# Patient Record
Sex: FEMALE | Race: OTHER | Employment: UNEMPLOYED | URBAN - METROPOLITAN AREA
[De-identification: names, ages, dates, MRNs, and addresses within clinical notes are randomized per-mention and may not be internally consistent; named-entity substitution may affect disease eponyms.]

---

## 2023-01-26 ENCOUNTER — HOSPITAL ENCOUNTER (EMERGENCY)
Age: 28
Discharge: HOME | End: 2023-01-26
Attending: EMERGENCY MEDICINE | Admitting: OBSTETRICS & GYNECOLOGY

## 2023-01-26 ENCOUNTER — APPOINTMENT (OUTPATIENT)
Dept: ULTRASOUND IMAGING | Age: 28
End: 2023-01-26
Attending: PHYSICIAN ASSISTANT

## 2023-01-26 VITALS
OXYGEN SATURATION: 99 % | DIASTOLIC BLOOD PRESSURE: 76 MMHG | HEIGHT: 60 IN | BODY MASS INDEX: 24.24 KG/M2 | RESPIRATION RATE: 16 BRPM | WEIGHT: 123.46 LBS | SYSTOLIC BLOOD PRESSURE: 121 MMHG | HEART RATE: 82 BPM | TEMPERATURE: 98.7 F

## 2023-01-26 DIAGNOSIS — O26.893 ABDOMINAL PAIN DURING PREGNANCY IN THIRD TRIMESTER: Primary | ICD-10-CM

## 2023-01-26 DIAGNOSIS — R10.9 ABDOMINAL PAIN DURING PREGNANCY IN THIRD TRIMESTER: Primary | ICD-10-CM

## 2023-01-26 LAB
ABO + RH BLD: NORMAL
ALBUMIN SERPL-MCNC: 2.9 G/DL (ref 3.5–5)
ALBUMIN/GLOB SERPL: 0.7 (ref 1.1–2.2)
ALP SERPL-CCNC: 158 U/L (ref 45–117)
ALT SERPL-CCNC: 26 U/L (ref 12–78)
ANION GAP SERPL CALC-SCNC: 10 MMOL/L (ref 5–15)
APPEARANCE UR: CLEAR
AST SERPL-CCNC: 19 U/L (ref 15–37)
BACTERIA URNS QL MICRO: NEGATIVE /HPF
BASOPHILS # BLD: 0 K/UL (ref 0–0.1)
BASOPHILS # BLD: 0 K/UL (ref 0–0.1)
BASOPHILS NFR BLD: 0 % (ref 0–1)
BASOPHILS NFR BLD: 0 % (ref 0–1)
BILIRUB SERPL-MCNC: 0.2 MG/DL (ref 0.2–1)
BILIRUB UR QL: NEGATIVE
BLOOD GROUP ANTIBODIES SERPL: NORMAL
BUN SERPL-MCNC: 6 MG/DL (ref 6–20)
BUN/CREAT SERPL: 10 (ref 12–20)
CALCIUM SERPL-MCNC: 8.6 MG/DL (ref 8.5–10.1)
CHLORIDE SERPL-SCNC: 106 MMOL/L (ref 97–108)
CO2 SERPL-SCNC: 22 MMOL/L (ref 21–32)
COLOR UR: ABNORMAL
CREAT SERPL-MCNC: 0.6 MG/DL (ref 0.55–1.02)
DIFFERENTIAL METHOD BLD: ABNORMAL
DIFFERENTIAL METHOD BLD: ABNORMAL
EOSINOPHIL # BLD: 0 K/UL (ref 0–0.4)
EOSINOPHIL # BLD: 0 K/UL (ref 0–0.4)
EOSINOPHIL NFR BLD: 0 % (ref 0–7)
EOSINOPHIL NFR BLD: 0 % (ref 0–7)
EPITH CASTS URNS QL MICRO: ABNORMAL /LPF
ERYTHROCYTE [DISTWIDTH] IN BLOOD BY AUTOMATED COUNT: 11.6 % (ref 11.5–14.5)
ERYTHROCYTE [DISTWIDTH] IN BLOOD BY AUTOMATED COUNT: 11.8 % (ref 11.5–14.5)
GLOBULIN SER CALC-MCNC: 4.1 G/DL (ref 2–4)
GLUCOSE SERPL-MCNC: 84 MG/DL (ref 65–100)
GLUCOSE UR STRIP.AUTO-MCNC: NEGATIVE MG/DL
HCG SERPL-ACNC: ABNORMAL MIU/ML (ref 0–6)
HCG UR QL: POSITIVE
HCT VFR BLD AUTO: 31.7 % (ref 35–47)
HCT VFR BLD AUTO: 32.8 % (ref 35–47)
HGB BLD-MCNC: 10.9 G/DL (ref 11.5–16)
HGB BLD-MCNC: 11 G/DL (ref 11.5–16)
HGB UR QL STRIP: NEGATIVE
HYALINE CASTS URNS QL MICRO: ABNORMAL /LPF (ref 0–2)
IMM GRANULOCYTES # BLD AUTO: 0.1 K/UL (ref 0–0.04)
IMM GRANULOCYTES # BLD AUTO: 0.1 K/UL (ref 0–0.04)
IMM GRANULOCYTES NFR BLD AUTO: 1 % (ref 0–0.5)
IMM GRANULOCYTES NFR BLD AUTO: 1 % (ref 0–0.5)
KETONES UR QL STRIP.AUTO: 15 MG/DL
LEUKOCYTE ESTERASE UR QL STRIP.AUTO: ABNORMAL
LYMPHOCYTES # BLD: 1.6 K/UL (ref 0.8–3.5)
LYMPHOCYTES # BLD: 1.9 K/UL (ref 0.8–3.5)
LYMPHOCYTES NFR BLD: 17 % (ref 12–49)
LYMPHOCYTES NFR BLD: 20 % (ref 12–49)
MCH RBC QN AUTO: 30.3 PG (ref 26–34)
MCH RBC QN AUTO: 30.9 PG (ref 26–34)
MCHC RBC AUTO-ENTMCNC: 33.5 G/DL (ref 30–36.5)
MCHC RBC AUTO-ENTMCNC: 34.4 G/DL (ref 30–36.5)
MCV RBC AUTO: 89.8 FL (ref 80–99)
MCV RBC AUTO: 90.4 FL (ref 80–99)
MONOCYTES # BLD: 0.6 K/UL (ref 0–1)
MONOCYTES # BLD: 0.6 K/UL (ref 0–1)
MONOCYTES NFR BLD: 6 % (ref 5–13)
MONOCYTES NFR BLD: 6 % (ref 5–13)
NEUTS SEG # BLD: 6.8 K/UL (ref 1.8–8)
NEUTS SEG # BLD: 7.3 K/UL (ref 1.8–8)
NEUTS SEG NFR BLD: 73 % (ref 32–75)
NEUTS SEG NFR BLD: 76 % (ref 32–75)
NITRITE UR QL STRIP.AUTO: NEGATIVE
NRBC # BLD: 0 K/UL (ref 0–0.01)
NRBC # BLD: 0 K/UL (ref 0–0.01)
NRBC BLD-RTO: 0 PER 100 WBC
NRBC BLD-RTO: 0 PER 100 WBC
PH UR STRIP: 5.5 (ref 5–8)
PLATELET # BLD AUTO: 414 K/UL (ref 150–400)
PLATELET # BLD AUTO: 439 K/UL (ref 150–400)
PMV BLD AUTO: 9.3 FL (ref 8.9–12.9)
PMV BLD AUTO: 9.6 FL (ref 8.9–12.9)
POTASSIUM SERPL-SCNC: 3.5 MMOL/L (ref 3.5–5.1)
PROT SERPL-MCNC: 7 G/DL (ref 6.4–8.2)
PROT UR STRIP-MCNC: NEGATIVE MG/DL
RBC # BLD AUTO: 3.53 M/UL (ref 3.8–5.2)
RBC # BLD AUTO: 3.63 M/UL (ref 3.8–5.2)
RBC #/AREA URNS HPF: ABNORMAL /HPF (ref 0–5)
SODIUM SERPL-SCNC: 138 MMOL/L (ref 136–145)
SP GR UR REFRACTOMETRY: 1.02 (ref 1–1.03)
SPECIMEN EXP DATE BLD: NORMAL
UR CULT HOLD, URHOLD: NORMAL
UROBILINOGEN UR QL STRIP.AUTO: 0.2 EU/DL (ref 0.2–1)
WBC # BLD AUTO: 9.3 K/UL (ref 3.6–11)
WBC # BLD AUTO: 9.6 K/UL (ref 3.6–11)
WBC URNS QL MICRO: ABNORMAL /HPF (ref 0–4)

## 2023-01-26 PROCEDURE — 83036 HEMOGLOBIN GLYCOSYLATED A1C: CPT

## 2023-01-26 PROCEDURE — 83020 HEMOGLOBIN ELECTROPHORESIS: CPT

## 2023-01-26 PROCEDURE — 86900 BLOOD TYPING SEROLOGIC ABO: CPT

## 2023-01-26 PROCEDURE — 85025 COMPLETE CBC W/AUTO DIFF WBC: CPT

## 2023-01-26 PROCEDURE — 87491 CHLMYD TRACH DNA AMP PROBE: CPT

## 2023-01-26 PROCEDURE — 59025 FETAL NON-STRESS TEST: CPT

## 2023-01-26 PROCEDURE — 86901 BLOOD TYPING SEROLOGIC RH(D): CPT

## 2023-01-26 PROCEDURE — 84702 CHORIONIC GONADOTROPIN TEST: CPT

## 2023-01-26 PROCEDURE — 99285 EMERGENCY DEPT VISIT HI MDM: CPT

## 2023-01-26 PROCEDURE — 36415 COLL VENOUS BLD VENIPUNCTURE: CPT

## 2023-01-26 PROCEDURE — 81025 URINE PREGNANCY TEST: CPT

## 2023-01-26 PROCEDURE — 99284 EMERGENCY DEPT VISIT MOD MDM: CPT

## 2023-01-26 PROCEDURE — 80053 COMPREHEN METABOLIC PANEL: CPT

## 2023-01-26 PROCEDURE — 76815 OB US LIMITED FETUS(S): CPT

## 2023-01-26 PROCEDURE — 81001 URINALYSIS AUTO W/SCOPE: CPT

## 2023-01-26 NOTE — ED TRIAGE NOTES
Pt arrives co of stomach pain and a headache for two weeks  Denies NVD.  States \"my stomach feels hard\"  States no changes to BM  Pt states last period was in October and is sexually active
Spouse

## 2023-01-27 ENCOUNTER — PATIENT OUTREACH (OUTPATIENT)
Dept: FAMILY MEDICINE CLINIC | Age: 28
End: 2023-01-27

## 2023-01-27 ENCOUNTER — TELEPHONE (OUTPATIENT)
Dept: FAMILY MEDICINE CLINIC | Age: 28
End: 2023-01-27

## 2023-01-27 LAB
ABO + RH BLD: NORMAL
BLOOD BANK CMNT PATIENT-IMP: NORMAL
EST. AVERAGE GLUCOSE BLD GHB EST-MCNC: 105 MG/DL
HBA1C MFR BLD: 5.3 % (ref 4–5.6)

## 2023-01-27 NOTE — PROCEDURES
NST Procedure Note    Patient: Scarlet Nunes               Sex: female          DOA: 1/26/2023       YOB: 1995      Age:  32 y.o.        LOS:  LOS: 0 days     MRN: [id number]                    [facility]: [id number]      Estimated Gestational Age:32w4d     Indication for NST: abdominal pain    NST: 15x15    Fetal Vital Signs:  Mode: External  Fetal Heart Rate:140  Fetal Activity: Present  Variability: Moderate 6-25 bpm  Decelerations:No  Accelerations:Yes  FHR Interpretations:Category I    Non-Stress Test: obgyn fetal nst findings: reactive    Uterine Activity:  Mode: External  Frequency (min): none         Natali Mattson MD  [facility] Hospitalist  [facility]    Signed by:Kavita Yap MD  1/27/2023 7:16 AM

## 2023-01-27 NOTE — DISCHARGE INSTRUCTIONS
[address]  clinic: [address] [facility]armal[facility] 33. [phone]. Recibirá teresa Foye [place] viernes 30 de enero sobre la hora de giles primera ivette prenatal.    [facility]: [address] [facility] M[facility]ai [facility] 57. [phone]. Recibirá teresa llamada mañana viernes 25 de enero sobre la hora de giles ivette de [place] prenatal.    [facility]: [address], [place], [facility] [phone]  Acude a Katie Nichole a recoger tus vitaminas prenatales.

## 2023-01-27 NOTE — ED PROVIDER NOTES
used. 30yo female  with no PMH, Bulgarian speaking who presents c/o abd pain, abd distention. She believes she is pregnant, unk how far along. She states LMP was July, then had some vaginal bleeding in October and none since. This is her first pregnancy. 2 weeks ago began to feel abd start to distend and has been having constant, progressively worsening abd pain x 1 week, worse today prompting the ER visit. She denies current vaginal bleeding or discharge. No F/C, recent vomiting, discharge, urinary sx's. No past medical history on file. No past surgical history on file. No family history on file. Social History     Socioeconomic History    Marital status: SINGLE     Spouse name: Not on file    Number of children: Not on file    Years of education: Not on file    Highest education level: Not on file   Occupational History    Not on file   Tobacco Use    Smoking status: Not on file    Smokeless tobacco: Not on file   Substance and Sexual Activity    Alcohol use: Not on file    Drug use: Not on file    Sexual activity: Not on file   Other Topics Concern    Not on file   Social History Narrative    Not on file     Social Determinants of Health     Financial Resource Strain: Not on file   Food Insecurity: Not on file   Transportation Needs: Not on file   Physical Activity: Not on file   Stress: Not on file   Social Connections: Not on file   Intimate Partner Violence: Not on file   Housing Stability: Not on file         ALLERGIES: Patient has no known allergies. Review of Systems   Constitutional: Negative. Negative for activity change, chills, fatigue and unexpected weight change. HENT:  Negative for trouble swallowing. Respiratory:  Negative for cough, chest tightness, shortness of breath and wheezing. Cardiovascular: Negative. Negative for chest pain and palpitations. Gastrointestinal:  Positive for abdominal pain. Negative for diarrhea, nausea and vomiting. Genitourinary: Negative. Negative for dysuria, flank pain, frequency and hematuria. Musculoskeletal: Negative. Negative for arthralgias, back pain, neck pain and neck stiffness. Skin: Negative. Negative for color change and rash. Neurological: Negative. Negative for dizziness, numbness and headaches. All other systems reviewed and are negative. Vitals:    01/26/23 1749 01/26/23 1753   BP: 125/77 125/77   Pulse: 87 96   Resp: 16 16   Temp: 98.1 °F (36.7 °C)    SpO2: 98% 100%            Physical Exam  Vitals and nursing note reviewed. Constitutional:       General: She is not in acute distress. Appearance: Normal appearance. She is well-developed. She is not toxic-appearing or diaphoretic. Comments:  female   HENT:      Head: Normocephalic and atraumatic. Eyes:      General:         Right eye: No discharge. Left eye: No discharge. Conjunctiva/sclera: Conjunctivae normal.   Neck:      Trachea: No tracheal tenderness. Cardiovascular:      Rate and Rhythm: Normal rate and regular rhythm. Pulses: Normal pulses. Heart sounds: Normal heart sounds. No murmur heard. No friction rub. No gallop. Pulmonary:      Effort: Pulmonary effort is normal. No respiratory distress. Breath sounds: Normal breath sounds. No wheezing or rales. Chest:      Chest wall: No tenderness. Abdominal:      General: Bowel sounds are normal. There is no distension. Palpations: Abdomen is soft. Tenderness: There is no rebound. Comments: Gravid abd, fundal height palpable ~4 fingers below xiphoid process. ABD non-tender. Musculoskeletal:         General: No tenderness. Normal range of motion. Cervical back: Full passive range of motion without pain and normal range of motion. Skin:     General: Skin is warm and dry. Capillary Refill: Capillary refill takes less than 2 seconds. Findings: No abrasion, erythema or rash.    Neurological:      General: No focal deficit present. Mental Status: She is alert and oriented to person, place, and time. Cranial Nerves: No cranial nerve deficit. Sensory: No sensory deficit. Coordination: Coordination normal.   Psychiatric:         Speech: Speech normal.         Behavior: Behavior normal.        Medical Decision Making    Ddx: pregnancy, labor    Amount and/or Complexity of Data Reviewed  Labs: ordered. Radiology: ordered. ECG/medicine tests: ordered. Procedures    Due to obvious pregnancy on exam and fundus palpable well above umbilicus suspect >29 week pregnancy. Dr. Adan Oates performed bedside US which showed head circumference measuring ~36w.     7:09 PM  I called L&D and notified of patient >20 weeks, pregnant c/o worsening abd pain. L&D states they are full, their waiting room is full. I advised our ED is also full, patient is currently in a chair. We do not have an available stretcher due to high volume and acuity in our emergency department. Charge nurse Aníbal Carroll is speaking with nursing supervisor to notify patient should be seen in L&D with OB due to third trimester pregnancy and pt is c/o abd pain. Nursing supervisor and charge nurse are speaking with L&D. L&D nurse notified Dr. Juan Francisco Suresh [facility] hospitalist who states if no beds in L&D become available she will come down to see patient. She is in no acute distress at this time. 8:29 PM  Not yet seen by OB. Patient is still in no acute distress. L&D called now, they will now take patient. Patient sent up to L&D.

## 2023-01-27 NOTE — TELEPHONE ENCOUNTER
Called patient to inform her that an appointment has been made for her to see Dr. Miracle Matta on 1/30/23 at [facility] for her initial prenatal visit. Provided her with the address for the clinic. Patient had no questions, plans to attend appointment on Monday.

## 2023-01-27 NOTE — PROGRESS NOTES
2044 Pt arrived with c/o HA, N/V, and abdominal pain. Pt report she wasn't aware she was pregnant and came to ER with above complaints. Pt reports no leaking of fluid or vaginal bleeding/discharge. 2044 RN at bedside, Dr Mirella Valadez and Dr Rashida Forrester in room to assess pt. Thierry Ruth ID# [id number] used for admisson questions and assessment. MD discussing starting prenantal care with [address] [place] [address] and importance of going to appointments. MD discussed applying for Medicaid to help with financial assistance. Pt verbalizes understanding and agrees to follow up with clinic in AM.     2052 RN and MD at bedside. Bedside US used to assess fetal wellbeing. No abnormalities noted on US. 2100 MD comfortable sending pt home after reactive NST obtained. MD at bedside discussing labor precautions and discharge instructions. Time allotted for questions and all questions answered. Pt agrees with POC and d/c home. 65 RN at bedsid\e with interpretor discussing d/c instructions. Time allotted for questions and all questions answered. Pt verbalized understanding. 2304 Pt off unit at this time.

## 2023-01-27 NOTE — H&P
[address] [address]   Office [phone], Fax [phone]      History & Physical    Name: Loretta Martini MRN: [id number]  SSN: [id number]    YOB: 1995  Age: 32 y.o. Sex: female      Subjective:     Reason for Admission:  Pregnancy and abd pain    History of Present Illness: Ms. Danielle Swartz is a 32 y.o.  female with an estimated gestational age of 35w2d with Estimated Date of Delivery: 3/20/23. Patient originally presented to the ED for abd pain and distension. She recently immigrated from [place] 3 months ago and thought her abnormal cycle was secondary to the stress of the move. Her LMP was on 22, and reports spotting on Oct 5, 2022. Patient denies chest pain, contractions, fever, headache , nausea and vomiting, right upper quadrant pain  , shortness of breath, swelling, vaginal bleeding , vaginal leaking of fluid , and visual disturbances. No contractions, +FM. OB History    Para Term  AB Living   1             SAB IAB Ectopic Molar Multiple Live Births                    # Outcome Date GA Lbr Asif/2nd Weight Sex Delivery Anes PTL Lv   1 Current              No past medical history on file. No past surgical history on file. Social History     Occupational History    Not on file   Tobacco Use    Smoking status: Not on file    Smokeless tobacco: Not on file   Substance and Sexual Activity    Alcohol use: Not on file    Drug use: Not on file    Sexual activity: Not on file      No family history on file. No Known Allergies  Prior to Admission medications    Not on File        Review of Systems:  A comprehensive review of systems was negative except for that written in the History of Present Illness.      Objective:     Vitals:    Vitals:    23 1749 23 1753 23 1914 23 2100   BP: 125/77 125/77     Pulse: 87 96     Resp: 16 16     Temp: 98.1 °F (36.7 °C)      SpO2: 98% 100%     Weight:   56 kg (123 lb 7.3 oz) 56 kg (123 lb 7.3 oz)   Height:    4' 11.84\" (1.52 m)      Temp (24hrs), Av.1 °F (36.7 °C), Min:98.1 °F (36.7 °C), Max:98.1 °F (36.7 °C)    BP  Min: 125/77  Max: 125/77     Physical Exam:  Patient without distress. Heart: Regular rate and rhythm, S1S2 present, or without murmur or extra heart sounds  Lung: clear to auscultation throughout lung fields, no wheezes, no rales, no rhonchi, and normal respiratory effort  Abdomen: soft, nontender  Perineum: blood absent, amniotic fluid absent  Cervical Exam: Closed/Thick/High  Lower Extremities:  - Edema No     Membranes:  Intact  Fetal Heart Rate:  Reactive  Baseline: 140 per minute  Variability: moderate  Accelerations: yes  Decelerations: none  Uterine contractions: none, mild uterine agitation on the monitor    Lab/Data Review:  Recent Results (from the past 24 hour(s))   HCG URINE, QL. - POC    Collection Time: 23  6:19 PM   Result Value Ref Range    Pregnancy test,urine (POC) Positive (A) NEG     CBC WITH AUTOMATED DIFF    Collection Time: 23  6:44 PM   Result Value Ref Range    WBC 9.3 3.6 - 11.0 K/uL    RBC 3.63 (L) 3.80 - 5.20 M/uL    HGB 11.0 (L) 11.5 - 16.0 g/dL    HCT 32.8 (L) 35.0 - 47.0 %    MCV 90.4 80.0 - 99.0 FL    MCH 30.3 26.0 - 34.0 PG    MCHC 33.5 30.0 - 36.5 g/dL    RDW 11.6 11.5 - 14.5 %    PLATELET 218 (H) 352 - 400 K/uL    MPV 9.6 8.9 - 12.9 FL    NRBC 0.0 0  WBC    ABSOLUTE NRBC 0.00 0.00 - 0.01 K/uL    NEUTROPHILS 73 32 - 75 %    LYMPHOCYTES 20 12 - 49 %    MONOCYTES 6 5 - 13 %    EOSINOPHILS 0 0 - 7 %    BASOPHILS 0 0 - 1 %    IMMATURE GRANULOCYTES 1 (H) 0.0 - 0.5 %    ABS. NEUTROPHILS 6.8 1.8 - 8.0 K/UL    ABS. LYMPHOCYTES 1.9 0.8 - 3.5 K/UL    ABS. MONOCYTES 0.6 0.0 - 1.0 K/UL    ABS. EOSINOPHILS 0.0 0.0 - 0.4 K/UL    ABS. BASOPHILS 0.0 0.0 - 0.1 K/UL    ABS. IMM.  GRANS. 0.1 (H) 0.00 - 0.04 K/UL    DF AUTOMATED     METABOLIC PANEL, COMPREHENSIVE    Collection Time: 23  6:44 PM   Result Value Ref Range    Sodium 138 136 - 145 mmol/L Potassium 3.5 3.5 - 5.1 mmol/L    Chloride 106 97 - 108 mmol/L    CO2 22 21 - 32 mmol/L    Anion gap 10 5 - 15 mmol/L    Glucose 84 65 - 100 mg/dL    BUN 6 6 - 20 MG/DL    Creatinine 0.60 0.55 - 1.02 MG/DL    BUN/Creatinine ratio 10 (L) 12 - 20      eGFR >60 >60 ml/min/1.73m2    Calcium 8.6 8.5 - 10.1 MG/DL    Bilirubin, total 0.2 0.2 - 1.0 MG/DL    ALT (SGPT) 26 12 - 78 U/L    AST (SGOT) 19 15 - 37 U/L    Alk. phosphatase 158 (H) 45 - 117 U/L    Protein, total 7.0 6.4 - 8.2 g/dL    Albumin 2.9 (L) 3.5 - 5.0 g/dL    Globulin 4.1 (H) 2.0 - 4.0 g/dL    A-G Ratio 0.7 (L) 1.1 - 2.2     BLOOD TYPE, (ABO+RH)    Collection Time: 23  6:44 PM   Result Value Ref Range    ABO/Rh(D) O POSITIVE     Comment SAMPLE NOT USABLE FOR CROSSMATCH    BETA HCG, QT    Collection Time: 23  6:45 PM   Result Value Ref Range    Beta HCG, QT 13,132 (H) 0 - 6 MIU/ML       Assessment and Plan:     Ms. Saskia Amos is a 32 y.o.   female with an estimated gestational age of 32w3d who is admitted for abd pain 2/2 pregnancy with no previous prenatal care. SIUP at [address] [facility]: patient reports LMP of 22. Bedside U/S performed with FL of 30w5d. Baby is vertex. - Will schedule first prenatal appointment with Mishel Alvarez and Complete OB U/S with [facility] tomorrow during daytime hours. - Ordered PNL to be collected now before first prenatal visit. - Prescribed PNV  - Counseled patient on applying for Medicaid. I have discussed the diagnosis with the patient and the intended plan as seen in the above orders. All questions were answered concerning future plans. I have discussed medication side effects and warnings with the patient as well. Labor precautions discussed, including: Regular painful contractions, lasting for greater than one hour, taking your breath away; any vaginal bleeding; any leakage of fluid; or absent or decreased fetal movement. Call M.D. on call if any of these symptoms or signs occur.       Patient discussed with Dr. Sandeep Garcia.     Iris Jackson MD  Family Medicine Resident

## 2023-01-28 NOTE — PROGRESS NOTES
Subjective:   Shreya Mondragon is a 32 y.o.  at 33w0d with OSVALDO of 3/20/2023 based on LMP (2022), who is being seen today for her first initial obstetrical visit. This is not a planned pregnancy. She is from [facility] and left for the [address] [place] [address] in August. She did not realize she was pregnant until recently (thought lack of periods was due to stress of moving) and has had no prenatal care until now. See flow sheet for OB history. PUT OB DELIVERY HISTORY IN THE CHART!!!***     History of GDM or DM? ***  History of GHTN or HTN? ***  History of pre-eclampsia? ***  History of thyroid disorder? ***  History of PTD?***  History of ? ***  History of Genetic disorders? ***  History of STD's? ***  History of abnormal PAP? ***  Taking prenatal vitamins? ***    Allergies- reviewed:   No Known Allergies    Medications- reviewed:   Current Outpatient Medications   Medication Sig    PNV no.121-iron-folic acid 28 mg iron- 800 mcg tab Take 1 Tablet by mouth daily. No current facility-administered medications for this visit. Past Medical History- reviewed:  No past medical history on file. Past Surgical History- reviewed:   No past surgical history on file.     Social History- reviewed:  Social History     Socioeconomic History    Marital status: SINGLE     Spouse name: Not on file    Number of children: Not on file    Years of education: Not on file    Highest education level: 6th grade   Occupational History    Not on file   Tobacco Use    Smoking status: Never    Smokeless tobacco: Never   Vaping Use    Vaping Use: Never used   Substance and Sexual Activity    Alcohol use: Never    Drug use: Never    Sexual activity: Not on file   Other Topics Concern     Service No    Blood Transfusions No    Caffeine Concern No    Occupational Exposure No    Hobby Hazards No    Sleep Concern No    Stress Concern No    Weight Concern No    Special Diet No    Back Care No    Exercise No    Bike Helmet No    Seat Belt No    Self-Exams No   Social History Narrative    Not on file     Social Determinants of Health     Financial Resource Strain: Not on file   Food Insecurity: Not on file   Transportation Needs: Not on file   Physical Activity: Not on file   Stress: Not on file   Social Connections: Not on file   Intimate Partner Violence: Not on file   Housing Stability: Not on file         Objective:   Visit Vitals  LMP 2022       See physical exam on flowsheet  Pelvic exam chaperoned by ***, ***  (Pt needs pelvic at every IOB visit regardless if she is getting a pap smear)     Labs:  ***  No results found for this or any previous visit (from the past 12 hour(s)). Assessment and Plan:     {No Diagnosis Found}    32 y.o.  33w0d OSVALDO 3/20/2023, by Last Menstrual Period here for initial OB visit     PNL: O+, Ab neg, Hgb fractionation ***, GC/CT ***, 3rd tri Hgb 11.0 (on 23), A1c 5.3  Collecting remainder of prenatal labs today: Pap smear, 1' GTT, UA, Ucx, HepBSAg ***, Hep C Ab ***, HIV/RPR ***, Rubella/VZV ***, UDS today ***  Giving influenza vaccine and Tdap today ***  Recommended prenatal vitamins daily   Discussed optional genetic screening (1st trimester with MFM (11-14wk) vs cell free fetal DNA (>10wk- does not test for NTD- needs AFP added) vs AFP tetra between 15-21.6wk, MSAFP only if pt has had 1st tri): ***  Request for MFM anatomy scan faxed: ***  Dating ultrasound done on L&D on 23 c/w 31 weeks gestation   Follow up in 2 weeks      No orders of the defined types were placed in this encounter. I have discussed the diagnosis with the patient and the intended plan as seen in the above orders. The patient has received an after-visit summary and questions were answered concerning future plans. I have discussed medication side effects and warnings with the patient as well.  Informed pt to return to the office or go to the ER if she experiences vaginal bleeding, vaginal discharge, leaking of fluid, pelvic cramping.       Pt discussed with Dr. Mynor Medina (attending physician)    Alexa Beard DO  Family Medicine Resident

## 2023-01-29 LAB
C TRACH RRNA SPEC QL NAA+PROBE: POSITIVE
N GONORRHOEA RRNA SPEC QL NAA+PROBE: NEGATIVE
SPECIMEN SOURCE: ABNORMAL

## 2023-01-30 ENCOUNTER — HOSPITAL ENCOUNTER (OUTPATIENT)
Dept: LAB | Age: 28
Discharge: HOME | End: 2023-01-30
Payer: SELF-PAY

## 2023-01-30 ENCOUNTER — ROUTINE PRENATAL (OUTPATIENT)
Dept: FAMILY MEDICINE CLINIC | Age: 28
End: 2023-01-30

## 2023-01-30 VITALS
RESPIRATION RATE: 17 BRPM | DIASTOLIC BLOOD PRESSURE: 66 MMHG | BODY MASS INDEX: 24.35 KG/M2 | HEART RATE: 80 BPM | OXYGEN SATURATION: 100 % | TEMPERATURE: 97.5 F | WEIGHT: 124 LBS | SYSTOLIC BLOOD PRESSURE: 101 MMHG | HEIGHT: 60 IN

## 2023-01-30 DIAGNOSIS — Z3A.33 33 WEEKS GESTATION OF PREGNANCY: ICD-10-CM

## 2023-01-30 DIAGNOSIS — O98.313 CHLAMYDIA TRACHOMATIS INFECTION IN MOTHER DURING THIRD TRIMESTER OF PREGNANCY: ICD-10-CM

## 2023-01-30 DIAGNOSIS — A56.8 CHLAMYDIA TRACHOMATIS INFECTION IN MOTHER DURING THIRD TRIMESTER OF PREGNANCY: ICD-10-CM

## 2023-01-30 DIAGNOSIS — O09.33 INITIAL OBSTETRIC VISIT IN THIRD TRIMESTER: Primary | ICD-10-CM

## 2023-01-30 LAB
BILIRUB UR QL STRIP: NEGATIVE
GLUCOSE UR-MCNC: NEGATIVE MG/DL
KETONES P FAST UR STRIP-MCNC: NEGATIVE MG/DL
PH UR STRIP: 6 [PH] (ref 4.6–8)
PROT UR QL STRIP: NEGATIVE
SP GR UR STRIP: 1.01 (ref 1–1.03)
UA UROBILINOGEN AMB POC: NORMAL (ref 0.2–1)
URINALYSIS CLARITY POC: NORMAL
URINALYSIS COLOR POC: YELLOW
URINE BLOOD POC: NEGATIVE
URINE LEUKOCYTES POC: NEGATIVE
URINE NITRITES POC: NEGATIVE

## 2023-01-30 PROCEDURE — 90686 IIV4 VACC NO PRSV 0.5 ML IM: CPT

## 2023-01-30 PROCEDURE — 88175 CYTOPATH C/V AUTO FLUID REDO: CPT

## 2023-01-30 PROCEDURE — 90471 IMMUNIZATION ADMIN: CPT

## 2023-01-30 RX ORDER — AZITHROMYCIN 500 MG/1
1000 TABLET, FILM COATED ORAL DAILY
Qty: 1 TABLET | Refills: 0 | Status: SHIPPED | OUTPATIENT
Start: 2023-01-30 | End: 2023-01-31

## 2023-01-30 NOTE — PROGRESS NOTES
I reviewed with the resident the medical history and the resident's findings on the physical examination. I discussed with the resident the patient's diagnosis and concur with the plan.     30yo G1 @ 33w0d by 30 wk scan at the hospital   IUP: RH pos, s/p tdap, GTT today   CT pos this pregnancy: treating, will need KASIA   Late to care: at 33 wk, UDS today     Seen by [facility]

## 2023-01-30 NOTE — PROGRESS NOTES
Chief Complaint   Patient presents with    Routine Prenatal Visit     . 33w 0d today. No c/o.     1. Have you been to the ER, urgent care clinic since your last visit? Hospitalized since your last visit? No    2. Have you seen or consulted any other health care providers outside of the [address] [place] [address] since your last visit? Include any pap smears or colon screening.  No

## 2023-01-30 NOTE — PROGRESS NOTES
Subjective:   Alexa Garcia is a 32 y.o.  at 33w0d with OSVALDO of 3/20/2023 based on LMP (2022), who is being seen today for her first initial obstetrical visit. This is not a planned pregnancy. She is from [facility] and left for the [address] [place] [address],[address] in August. She did not realize she was pregnant until recently (thought lack of periods was due to stress of moving) and has had no prenatal care until now. OB History:  See Chart    This is not a planned pregnancy. LMP: 2022  GA: 33w0d   Estimated Date of Delivery: 3/20/23      History of GDM or DM? No  History of GHTN or HTN? No  History of pre-eclampsia? No  History of PPH? No  Taking prenatal vitamins? Yes  History of Sexual trauma? No  History of STI's? No  History of Depression? No    Relevant past medical history:(relevant to this pregnancy):   Denies HTN, DM or asthma. Denies thyroid problems      Pap smear history:  Last pap smear: Does not remember     Substance history:   She does not report current tobacco use. She does not report current alcohol use. She does not report current drug use. Exposure history: There are not indoor cat(s) in the home. The patient was instructed not to change cat litter boxes during pregnancy. Patient does not report issues with domestic violence. Allergies- reviewed:   No Known Allergies    Medications- reviewed:   Current Outpatient Medications   Medication Sig    azithromycin (ZITHROMAX) 500 mg tab Take 2 Tablets by mouth daily for 1 day. PNV no.121-iron-folic acid 28 mg iron- 800 mcg tab Take 1 Tablet by mouth daily. No current facility-administered medications for this visit. Past Medical History- reviewed:  History reviewed. No pertinent past medical history. Past Surgical History- reviewed:   History reviewed. No pertinent surgical history.     Social History- reviewed:  Social History     Socioeconomic History    Marital status: SINGLE     Spouse name: Not on file Number of children: Not on file    Years of education: Not on file    Highest education level: 6th grade   Occupational History    Not on file   Tobacco Use    Smoking status: Never    Smokeless tobacco: Never   Vaping Use    Vaping Use: Never used   Substance and Sexual Activity    Alcohol use: Never    Drug use: Never    Sexual activity: Not on file   Other Topics Concern     Service No    Blood Transfusions No    Caffeine Concern No    Occupational Exposure No    Hobby Hazards No    Sleep Concern No    Stress Concern No    Weight Concern No    Special Diet No    Back Care No    Exercise No    Bike Helmet No    Seat Belt No    Self-Exams No   Social History Narrative    Not on file     Social Determinants of Health     Financial Resource Strain: Not on file   Food Insecurity: Not on file   Transportation Needs: Not on file   Physical Activity: Not on file   Stress: Not on file   Social Connections: Not on file   Intimate Partner Violence: Not on file   Housing Stability: Not on file       OB History- reviewed:  OB History    Para Term  AB Living   1 0           SAB IAB Ectopic Molar Multiple Live Births                    # Outcome Date GA Lbr Asif/2nd Weight Sex Delivery Anes PTL Lv   1 Current                 Objective:   Visit Vitals  /66   Pulse 80   Temp 97.5 °F (36.4 °C) (Temporal)   Resp 17   Ht 4' 11.75\" (1.518 m)   Wt 124 lb (56.2 kg)   LMP 2022   SpO2 100%   BMI 24.42 kg/m²       See physical exam on flowsheet   [employer] exam chaperoned by     Labs:  Recent Results (from the past 12 hour(s))   AMB POC URINALYSIS DIP STICK AUTO W/O MICRO    Collection Time: 23 10:29 AM   Result Value Ref Range    Color (UA POC) Yellow     Clarity (UA POC) Slightly Cloudy     Glucose (UA POC) Negative Negative    Bilirubin (UA POC) Negative Negative    Ketones (UA POC) Negative Negative    Specific gravity (UA POC) 1.010 1.001 - 1.035    Blood (UA POC) Negative Negative    pH (UA POC) 6.0 4.6 - 8.0    Protein (UA POC) Negative Negative    Urobilinogen (UA POC) 0.2 mg/dL 0.2 - 1    Nitrites (UA POC) Negative Negative    Leukocyte esterase (UA POC) Negative Negative         Assessment and Plan:         ICD-10-CM ICD-9-CM    1. Initial obstetric visit in third trimester  O09.33 V23.7 GLUCOSE, GESTATIONAL 1 HR TOLERANCE      AMB POC URINALYSIS DIP STICK AUTO W/O MICRO      CULTURE, URINE      HEP B SURFACE AG      HEPATITIS C AB      HIV 1/2 AG/AB, 4TH GENERATION,W RFLX CONFIRM      RPR      RUBELLA AB, IGG      VZV AB, IGG      DRUG SCREEN, URINE      TDAP, BOOSTRIX, (AGE 10 YRS+), IM      INFLUENZA, FLUARIX, FLULAVAL, FLUZONE (AGE 6 MO+), AFLURIA(AGE 3Y+) IM, PF, 0.5 ML      AZ IMMUNIZ ADMIN,1 SINGLE/COMB VAC/TOXOID      PAP IG, RFX APTIMA HPV ASCUS ([id number])      DRUG SCREEN, URINE      CULTURE, URINE      VZV AB, IGG      RUBELLA AB, IGG      RPR      HIV 1/2 AG/AB, 4TH GENERATION,W RFLX CONFIRM      HEPATITIS C AB      HEP B SURFACE AG      GLUCOSE, GESTATIONAL 1 HR TOLERANCE      REFERRAL TO MATERNAL FETAL MEDICINE      2. 33 weeks gestation of pregnancy  Z3A.33 V22.2       3. Chlamydia trachomatis infection in mother during third trimester of pregnancy  O80.1 647.23 azithromycin (ZITHROMAX) 500 mg tab    A56.8 099.50           32 y.o.  33w0d OSVALDO 3/20/2023, by Last Menstrual Period here for initial OB visit     Prenatal care  - Rest of IOB labs collected. 1hr gtt collected  - Vaccines: S/p Flu and Tdap today.  S/p Covid vaccine x2 in [facility] - pt will bring records to next visit  - Discussed recommended weight gain (prepreg BMI <19.8/28-40lb, (19.8-26)/25-35lb, (26-29)/15-25lb, >29/11-20lb)  - Discussed optional genetic screening (1st trimester with MFM (11-14wk) vs cell free fetal DNA (>10wk) vs AFP tetra between 15-21.6wk, MSAFP only if pt has had 1st tri): patient declined genetic screening today, would like it potentially done after being approved for medicaid  - [facility] anatomy scan and dating ultrasound scheduled  - Continue PNV    Pregnancy complications:  Chlamydia positive: Positive on initial labs collected in hospital. Sent Azithromycin 1g once. Counseled on treating sexual contacts. Will need KASIA 4 weeks after completion of abx   Late to prenatal care: UDS collected    ---------------------------------------  Pain mgmt. in labor: TBD  Lactation/UBB: Met with lactation 1/30/23.  Provided UBB resource for medicaid application  Feeding: TBD  Circ:  TBD  BC Plan: TBD  ---------------------------------------    Orders Placed This Encounter    NM IMMUNIZ ADMIN,1 SINGLE/COMB VAC/TOXOID    CULTURE, URINE     Standing Status:   Future     Number of Occurrences:   1     Standing Expiration Date:   1/28/2024    TDAP, BOOSTRIX, (AGE 10 YRS+), IM    Influenza, FLUARIX, FLULAVAL, FLUZONE (age 10 mo+), AFLURIA (age 3y+) IM, PF, 0.5 mL    GLUCOSE, GESTATIONAL 1 HR TOLERANCE     Standing Status:   Future     Number of Occurrences:   1     Standing Expiration Date:   1/28/2024    HEP B SURFACE AG     Standing Status:   Future     Number of Occurrences:   1     Standing Expiration Date:   1/27/2024    HEPATITIS C AB     Standing Status:   Future     Number of Occurrences:   1     Standing Expiration Date:   1/27/2024    HIV 1/2 AG/AB, 4TH GENERATION,W RFLX CONFIRM     Standing Status:   Future     Number of Occurrences:   1     Standing Expiration Date:   1/27/2024    RPR     Standing Status:   Future     Number of Occurrences:   1     Standing Expiration Date:   1/28/2024    RUBELLA AB, IGG     Standing Status:   Future     Number of Occurrences:   1     Standing Expiration Date:   1/28/2024    VZV AB, IGG     Standing Status:   Future     Number of Occurrences:   1     Standing Expiration Date:   1/27/2024    DRUG SCREEN, URINE     Standing Status:   Future     Number of Occurrences:   1     Standing Expiration Date:   1/27/2024    [facility] Maternal Fetal [facility]ET EMPL     Referral Priority:   Routine Referral Type:   Consultation     Referral Reason:   Specialty Services Required     Referred to Provider:   Sue Naqvi MD     Requested Specialty:   Maternal Fetal Medicine Physician     Number of Visits Requested:   1    AMB POC URINALYSIS DIP STICK AUTO W/O MICRO    azithromycin (ZITHROMAX) 500 mg tab     Sig: Take 2 Tablets by mouth daily for 1 day. Dispense:  1 Tablet     Refill:  0    PAP IG, RFX APTIMA HPV ASCUS ([id number])     Standing Status:   Future     Standing Expiration Date:   1/27/2024     Order Specific Question:   Pap Source? Answer:   Endocervical     Order Specific Question:   Total Hysterectomy? Answer:   No     Order Specific Question:   Supracervical Hysterectomy? Answer:   No     Order Specific Question:   Post Menopausal?     Answer:   No     Order Specific Question:   Hormone Therapy? Answer:   No     Order Specific Question:   IUD? Answer:   No     Order Specific Question:   Abnormal Bleeding? Answer:   No     Order Specific Question:   Pregnant     Answer:   Yes     Order Specific Question:   Post Partum? Answer:   No         I have discussed the diagnosis with the patient and the intended plan as seen in the above orders. The patient has received an after-visit summary and questions were answered concerning future plans. I have discussed medication side effects and warnings with the patient as well. Informed pt to return to the office or go to the ER if she experiences vaginal bleeding, vaginal discharge, leaking of fluid, pelvic cramping.       Pt seen and discussed with Hardy (attending physician)    iBnh Jauregui MD  Family Medicine Resident

## 2023-01-31 LAB
AMPHET UR QL SCN: NEGATIVE
BACTERIA SPEC CULT: NORMAL
BARBITURATES UR QL SCN: NEGATIVE
BENZODIAZ UR QL: NEGATIVE
CANNABINOIDS UR QL SCN: NEGATIVE
COCAINE UR QL SCN: NEGATIVE
DRUG SCRN COMMENT,DRGCM: NORMAL
GLUCOSE 1H P 100 G GLC PO SERPL-MCNC: 110 MG/DL (ref 65–140)
HBV SURFACE AG SER QL: <0.1 INDEX
HBV SURFACE AG SER QL: NEGATIVE
HCV AB SERPL QL IA: NONREACTIVE
HIV 1+2 AB+HIV1 P24 AG SERPL QL IA: NONREACTIVE
HIV12 RESULT COMMENT, HHIVC: NORMAL
METHADONE UR QL: NEGATIVE
OPIATES UR QL: NEGATIVE
PCP UR QL: NEGATIVE
RPR SER QL: NONREACTIVE
RUBV IGG SER-IMP: REACTIVE
RUBV IGG SERPL IA-ACNC: 111.1 IU/ML
SERVICE CMNT-IMP: NORMAL

## 2023-02-01 ENCOUNTER — HOSPITAL ENCOUNTER (OUTPATIENT)
Dept: PERINATAL CARE | Age: 28
Discharge: HOME | End: 2023-02-01
Attending: OBSTETRICS & GYNECOLOGY
Payer: SELF-PAY

## 2023-02-01 LAB — VZV IGG SER IA-ACNC: 1004 INDEX

## 2023-02-01 PROCEDURE — 76805 OB US >/= 14 WKS SNGL FETUS: CPT | Performed by: OBSTETRICS & GYNECOLOGY

## 2023-03-01 ENCOUNTER — HOSPITAL ENCOUNTER (OUTPATIENT)
Dept: PERINATAL CARE | Age: 28
Discharge: HOME | End: 2023-03-01
Attending: OBSTETRICS & GYNECOLOGY
Payer: SELF-PAY

## 2023-03-01 PROCEDURE — 76819 FETAL BIOPHYS PROFIL W/O NST: CPT | Performed by: OBSTETRICS & GYNECOLOGY

## 2023-03-01 PROCEDURE — 76816 OB US FOLLOW-UP PER FETUS: CPT | Performed by: OBSTETRICS & GYNECOLOGY

## 2023-03-01 PROCEDURE — 76820 UMBILICAL ARTERY ECHO: CPT | Performed by: OBSTETRICS & GYNECOLOGY

## 2023-03-06 ENCOUNTER — TELEPHONE (OUTPATIENT)
Dept: FAMILY MEDICINE CLINIC | Age: 28
End: 2023-03-06

## 2023-03-06 NOTE — TELEPHONE ENCOUNTER
Patient with limited prenatal care and multiple no-shows both with our clinic and  MFM clinic. Would you mind reaching out to patient to try to get her rescheduled? Thanks so much!      [facility], [address] [place] [address]  3/6/2023

## 2023-03-06 NOTE — LETTER
3/7/2023 4:43 PM    Ms. Scotty Becker  [address]  [id number]        Estimado/a Cherelle Danielle estoy escribiendo en referencia a la ivette que perdió el 3/6. Esta ivette estaba reservada especialmente para usted y nos preocupa que no haya asistido a la ivette y que no nos haya avisado. Para posponer giles ivette para otro día o cancelarla tiene que avisarnos con 24 horas de antelación. Graciela cortesía, nuestra oficina se encarga de llamar a nuestros pacientes con un mínimo de dos días antes para recodarle la fecha de ileana citas. [address] oportunidad de utilizar el tiempo reservado de la ivette para otros pacientes en el gurpreet que la ivette ya no sea necesaria o que será pospuesta par [employer]. Reconocemos que el tiempo de cada paciente es valioso y que el tiempo para acomodar todas las citas es limitado. Por lo tanto, le agradeceríamos que nos avise un mínimo de 24 horas si no va a poder acudir teresa ivette que está ya programada. Por favor, llámenos si tiene alguna pregunta o inquietud. Le agradecemos en adelantado por giles cooperación adhiriéndose a nuestra política de cancelaciones. Giles cuidado prenatal es muy importante. Por favor llámenos los más abril posible a [phone].         Sincerely,      Bashir Huston DO

## 2023-03-07 NOTE — TELEPHONE ENCOUNTER
Using  line, called patient and left a message that I was calling to reschedule her missed prenatal appointment and for her to give the office a call back as soon as possible. Also, I sent out a no show letter in [facility] [place]

## 2023-03-08 ENCOUNTER — HOSPITAL ENCOUNTER (OUTPATIENT)
Dept: PERINATAL CARE | Age: 28
Discharge: HOME | End: 2023-03-08
Attending: OBSTETRICS & GYNECOLOGY

## 2023-03-09 ENCOUNTER — HOSPITAL ENCOUNTER (INPATIENT)
Age: 28
LOS: 3 days | Discharge: HOME | End: 2023-03-12
Attending: FAMILY MEDICINE | Admitting: OBSTETRICS & GYNECOLOGY

## 2023-03-09 ENCOUNTER — OFFICE VISIT (OUTPATIENT)
Dept: FAMILY MEDICINE CLINIC | Age: 28
End: 2023-03-09

## 2023-03-09 ENCOUNTER — HOSPITAL ENCOUNTER (EMERGENCY)
Age: 28
Discharge: HOME | End: 2023-03-09
Attending: FAMILY MEDICINE | Admitting: FAMILY MEDICINE

## 2023-03-09 VITALS
WEIGHT: 125 LBS | OXYGEN SATURATION: 100 % | DIASTOLIC BLOOD PRESSURE: 83 MMHG | TEMPERATURE: 98 F | BODY MASS INDEX: 24.54 KG/M2 | SYSTOLIC BLOOD PRESSURE: 126 MMHG | RESPIRATION RATE: 16 BRPM | HEIGHT: 60 IN | HEART RATE: 84 BPM

## 2023-03-09 VITALS
RESPIRATION RATE: 16 BRPM | SYSTOLIC BLOOD PRESSURE: 126 MMHG | OXYGEN SATURATION: 98 % | TEMPERATURE: 97.6 F | HEART RATE: 98 BPM | WEIGHT: 125 LBS | BODY MASS INDEX: 24.54 KG/M2 | HEIGHT: 60 IN | DIASTOLIC BLOOD PRESSURE: 80 MMHG

## 2023-03-09 DIAGNOSIS — N93.9 VAGINAL BLEEDING: ICD-10-CM

## 2023-03-09 DIAGNOSIS — Z3A.37 37 WEEKS GESTATION OF PREGNANCY: Primary | ICD-10-CM

## 2023-03-09 DIAGNOSIS — Z3A.36 36 WEEKS GESTATION OF PREGNANCY: Primary | ICD-10-CM

## 2023-03-09 PROBLEM — O60.03 PRETERM LABOR WITHOUT DELIVERY, THIRD TRIMESTER: Status: ACTIVE | Noted: 2023-03-09

## 2023-03-09 LAB
ERYTHROCYTE [DISTWIDTH] IN BLOOD BY AUTOMATED COUNT: 14.4 % (ref 11.5–14.5)
HCT VFR BLD AUTO: 33.4 % (ref 35–47)
HGB BLD-MCNC: 11.4 G/DL (ref 11.5–16)
MCH RBC QN AUTO: 30.8 PG (ref 26–34)
MCHC RBC AUTO-ENTMCNC: 34.1 G/DL (ref 30–36.5)
MCV RBC AUTO: 90.3 FL (ref 80–99)
NRBC # BLD: 0 K/UL (ref 0–0.01)
NRBC BLD-RTO: 0 PER 100 WBC
PLATELET # BLD AUTO: 201 K/UL (ref 150–400)
PMV BLD AUTO: 10.1 FL (ref 8.9–12.9)
RBC # BLD AUTO: 3.7 M/UL (ref 3.8–5.2)
WBC # BLD AUTO: 11.8 K/UL (ref 3.6–11)

## 2023-03-09 PROCEDURE — 0502F SUBSEQUENT PRENATAL CARE: CPT | Performed by: STUDENT IN AN ORGANIZED HEALTH CARE EDUCATION/TRAINING PROGRAM

## 2023-03-09 PROCEDURE — 86900 BLOOD TYPING SEROLOGIC ABO: CPT

## 2023-03-09 PROCEDURE — 4A1HXCZ MONITORING OF PRODUCTS OF CONCEPTION, CARDIAC RATE, EXTERNAL APPROACH: ICD-10-PCS | Performed by: OBSTETRICS & GYNECOLOGY

## 2023-03-09 PROCEDURE — 75410000002 HC LABOR FEE PER 1 HR: Performed by: OBSTETRICS & GYNECOLOGY

## 2023-03-09 PROCEDURE — 74011000258 HC RX REV CODE- 258: Performed by: OBSTETRICS & GYNECOLOGY

## 2023-03-09 PROCEDURE — 36415 COLL VENOUS BLD VENIPUNCTURE: CPT

## 2023-03-09 PROCEDURE — 74011250636 HC RX REV CODE- 250/636: Performed by: OBSTETRICS & GYNECOLOGY

## 2023-03-09 PROCEDURE — 85027 COMPLETE CBC AUTOMATED: CPT

## 2023-03-09 PROCEDURE — 65270000029 HC RM PRIVATE

## 2023-03-09 RX ORDER — ONDANSETRON 2 MG/ML
4 INJECTION INTRAMUSCULAR; INTRAVENOUS
Status: DISCONTINUED | OUTPATIENT
Start: 2023-03-09 | End: 2023-03-10 | Stop reason: HOSPADM

## 2023-03-09 RX ORDER — SODIUM CHLORIDE 0.9 % (FLUSH) 0.9 %
5-40 SYRINGE (ML) INJECTION EVERY 8 HOURS
Status: DISCONTINUED | OUTPATIENT
Start: 2023-03-10 | End: 2023-03-12 | Stop reason: HOSPADM

## 2023-03-09 RX ORDER — LIDOCAINE HYDROCHLORIDE 10 MG/ML
20 INJECTION INFILTRATION; PERINEURAL ONCE
Status: ACTIVE | OUTPATIENT
Start: 2023-03-10 | End: 2023-03-10

## 2023-03-09 RX ORDER — CALCIUM CARBONATE 200(500)MG
400 TABLET,CHEWABLE ORAL
Status: DISCONTINUED | OUTPATIENT
Start: 2023-03-09 | End: 2023-03-10 | Stop reason: HOSPADM

## 2023-03-09 RX ORDER — ACETAMINOPHEN 325 MG/1
650 TABLET ORAL
Status: DISCONTINUED | OUTPATIENT
Start: 2023-03-09 | End: 2023-03-10 | Stop reason: HOSPADM

## 2023-03-09 RX ORDER — OXYTOCIN/RINGER'S LACTATE 30/500 ML
87.3 PLASTIC BAG, INJECTION (ML) INTRAVENOUS AS NEEDED
Status: DISCONTINUED | OUTPATIENT
Start: 2023-03-09 | End: 2023-03-12 | Stop reason: HOSPADM

## 2023-03-09 RX ORDER — OXYTOCIN/RINGER'S LACTATE 30/500 ML
10 PLASTIC BAG, INJECTION (ML) INTRAVENOUS AS NEEDED
Status: COMPLETED | OUTPATIENT
Start: 2023-03-09 | End: 2023-03-10

## 2023-03-09 RX ORDER — MAG HYDROX/ALUMINUM HYD/SIMETH 200-200-20
30 SUSPENSION, ORAL (FINAL DOSE FORM) ORAL
Status: DISCONTINUED | OUTPATIENT
Start: 2023-03-09 | End: 2023-03-10 | Stop reason: HOSPADM

## 2023-03-09 RX ORDER — SODIUM CHLORIDE 0.9 % (FLUSH) 0.9 %
5-40 SYRINGE (ML) INJECTION AS NEEDED
Status: DISCONTINUED | OUTPATIENT
Start: 2023-03-09 | End: 2023-03-12 | Stop reason: HOSPADM

## 2023-03-09 RX ORDER — SODIUM CHLORIDE, SODIUM LACTATE, POTASSIUM CHLORIDE, CALCIUM CHLORIDE 600; 310; 30; 20 MG/100ML; MG/100ML; MG/100ML; MG/100ML
125 INJECTION, SOLUTION INTRAVENOUS CONTINUOUS
Status: DISCONTINUED | OUTPATIENT
Start: 2023-03-10 | End: 2023-03-12 | Stop reason: HOSPADM

## 2023-03-09 RX ORDER — AZITHROMYCIN 250 MG/1
TABLET, FILM COATED ORAL DAILY
Status: CANCELLED | OUTPATIENT
Start: 2023-03-10

## 2023-03-09 RX ORDER — MINERAL OIL
120 OIL (ML) ORAL ONCE
Status: ACTIVE | OUTPATIENT
Start: 2023-03-10 | End: 2023-03-10

## 2023-03-09 RX ADMIN — SODIUM CHLORIDE 5 MILLION UNITS: 900 INJECTION INTRAVENOUS at 23:29

## 2023-03-09 NOTE — PROGRESS NOTES
I reviewed with the resident the medical history and the resident's findings on the physical examination. I discussed with the resident the patient's diagnosis and concur with the plan. Discussed the patient with Dr. Michael Centeno (FM_OB). The pt was sent to L&D for evaluation.

## 2023-03-09 NOTE — PROGRESS NOTES
1520 Patient arrived on unit as referred by [facility] for c/o vaginal bleeding/LOF.  #[id number] Jed Monroy) utilized to obtain PMH and to obtain information related to today's visit. Patient endorses +FM. She states that at 0500 today she had a scant amount of pink-tinged discharge and mild cramps; small amount of clear fluid reported and patient had to change panty liner once. 1600 Dr. Cehy Alfaro and resident Dr. Vani Taylor at bedside to evaluate patient.  #[id number] Haley Causey) utilized. Patient consented to speculum exam and abdominal/pelvic ultrasound. Per MD's patient okay for discharge home. [phone] Patient discharged home.

## 2023-03-09 NOTE — PROGRESS NOTES
I reviewed with the resident the medical history and the resident's findings on the physical examination. I discussed with the resident the patient's diagnosis and concur with the plan. Teleprecepting - I discussed plan of care with Dr. Carmela Whitt and Dr. Yessi Gonzalez.      Complaints of LOF, vaginal bleeding, contractions since 5am     26yo  at 36w6d by 36 wk scan at the hospital     IUP: RH pos  GTT okay, HgB 10.9  s/p tdap  GBS today   CT pos this pregnancy: treated, KASIA today   Late to care: at 33 wk, UDS neg   LOF/VB/Contractions: Contractions irregular, LOF story difficulty to determine - no large gush, steady leaking since 5am, blood-tinged mucous but no bright red blood  Advised sending to L&D for evaluation of ROM, monitoring of VB and FWD - discussed with L&D RN and OBH, resident team aware     Estimated Date of Delivery: 3/31/23  Seen by [facility]

## 2023-03-09 NOTE — PROGRESS NOTES
[address] [facility] [address] [place] [address] [place] [address]   Office [phone], Fax [phone]    Subjective:     Chief Complaint   Patient presents with    Abdominal Pain     Patient is 36 weeks and 6 days. Patient states that she started having intense abdominal pain that starts in her back and into her lower abdomen since this morning. She woke up at 5 am and could not go back to sleep. She has to be standing up because laying down is uncomfortable. She mentioned having spotting when she woke up at 5 am. She also mentioned a white fluid but nothing that gushed or is clear. Patient is currently crying due to pain and looks uncomfortable. She is taking prenatal vitamins. Vaginal Bleeding     She is having fetal movement. HPI:  Jyothi Perales is a 25 y.o.   OSVALDO: 3/31/2023, by Ultrasound  GA: 36w6d. that presents for: abdominal pain and vaginal bleeding since this morning. Symptoms began early this morning around 5am when she woke up. She reports the vaginal bleeding to be a small amount and not continuous, just \"spotting\". She reports the pain in her lower abdomen and lower back, every 10 minutes, lasting 1 min. She denies any gush of fluid but reports leakage of some clear fluid. She denies any CP, SOB, fever, chills, N/V/D or any other complaints at this time. She reports the abdominal pain has significantly improved and is not have any at this time. ROS:   ROS    Health Maintenance:  Health Maintenance Due   Topic Date Due    Depression Screen  Never done    COVID-19 Vaccine (1) Never done        Past Medical Hx  I personally reviewed. History reviewed. No pertinent past medical history. SocHx   I personally reviewed.   Social History     Socioeconomic History    Marital status: SINGLE     Spouse name: Not on file    Number of children: Not on file    Years of education: Not on file    Highest education level: 6th grade   Occupational History    Not on file   Tobacco Use    Smoking status: Never    Smokeless tobacco: Never   Vaping Use    Vaping Use: Never used   Substance and Sexual Activity    Alcohol use: Never    Drug use: Never    Sexual activity: Not on file   Other Topics Concern     Service No    Blood Transfusions No    Caffeine Concern No    Occupational Exposure No    Hobby Hazards No    Sleep Concern No    Stress Concern No    Weight Concern No    Special Diet No    Back Care No    Exercise No    Bike Helmet No    Seat Belt No    Self-Exams No   Social History Narrative    Not on file     Social Determinants of Health     Financial Resource Strain: Not on file   Food Insecurity: Not on file   Transportation Needs: Not on file   Physical Activity: Not on file   Stress: Not on file   Social Connections: Not on file   Intimate Partner Violence: Not on file   Housing Stability: Not on file        Allergies  I personally reviewed. No Known Allergies     Medications  I personally reviewed. Current Outpatient Medications on File Prior to Visit   Medication Sig Dispense Refill    PNV no.121-iron-folic acid 28 mg iron- 800 mcg tab Take 1 Tablet by mouth daily. 90 Tablet 3     No current facility-administered medications on file prior to visit. Objective:   Vitals  I personally reviewed. Visit Vitals  /80 (BP 1 Location: Left upper arm, BP Patient Position: Sitting)   Pulse 98   Temp 97.6 °F (36.4 °C) (Oral)   Resp 16   Ht 4' 11.75\" (1.518 m)   Wt 125 lb (56.7 kg)   LMP 06/13/2022   SpO2 98%   BMI 24.62 kg/m²        Physical Exam  Physical Exam     Physical Exam:  GENERAL APPEARANCE: alert, well appearing, in no apparent distress  ABDOMEN: gravid, FHT present at 140  bpm  GYN: Some blood tinged mucous in the vault; cervix 1/0/-3  PSYCH: normal mood and affect     Assessment/Plan:       Diagnoses and all orders for this visit:    1. 36 weeks gestation of pregnancy - Preg complicated by late to care and +chlamydia s/p treatment.  Pt was having regular contractions every 10 minutes, lasting 1 min since 5am this morning and some vaginal spotting. In clinic she is 1/0/-3 and reports that contractions have stopped. Nitrazine test positive but unreliable due to presence of blood. Will also collect GBS and G/C while patient is in clinic today. Patient still advised to go to the L&D for monitoring. PNL: O+, Ab neg, Hgb frac wnl, G neg, C pos, VZV/Rubella immune,  RPR/HepC/HepB/HIV nr. Ucx NG. UDS neg. 1hr gtt wnl, PAP NILM  -     AMB POC FERN TEST  -     CULTURE, GENITAL GROUP B STREP; Future  -     Dewight Sayres / GC-AMPLIFIED; Future  -     AMB POC PH, BODY FLUID EXCEPT BLOOD           Follow up: Follow-up and Dispositions    Return in about 1 week (around 3/16/2023) for Routine OB. Pt was discussed with Dr Lennox Pae (attending physician). I have reviewed patient medical and social history and medications. I have reviewed pertinent labs results and other data. I have discussed the diagnosis with the patient and the intended plan as seen in the above orders. The patient has received an after-visit summary and questions were answered concerning future plans. I have discussed medication side effects and warnings with the patient as well.     Dary Velasquez MD  Resident [facility]  03/09/23

## 2023-03-09 NOTE — PROGRESS NOTES
Marcie Louise is a 25 y.o. female    Chief Complaint   Patient presents with    Abdominal Pain     Patient is 36 weeks and 6 days. Patient states that she started having intense abdominal pain that starts in her back and into her lower abdomen since this morning. She woke up at 5 am and could not go back to sleep. She has to be standing up because laying down is uncomfortable. She mentioned having spotting when she woke up at 5 am. She also mentioned a white fluid but nothing that gushed or is clear. Patient is currently crying due to pain and looks uncomfortable. She is taking prenatal vitamins. Vaginal Bleeding     She is having fetal movement. 1. Have you been to the ER, urgent care clinic since your last visit? Hospitalized since your last visit? No    2. Have you seen or consulted any other health care providers outside of the [address] [place] [address] since your last visit? Include any pap smears or colon screening. No      Visit Vitals  /80 (BP 1 Location: Left upper arm, BP Patient Position: Sitting)   Pulse 98   Temp 97.6 °F (36.4 °C) (Oral)   Resp 16   Ht 4' 11.75\" (1.518 m)   Wt 125 lb (56.7 kg)   SpO2 98%   BMI 24.62 kg/m²           Health Maintenance Due   Topic Date Due    Depression Screen  Never done    COVID-19 Vaccine (1) Never done         Medication Reconciliation completed, changes noted.   Please  Update medication list.

## 2023-03-09 NOTE — H&P
[address] [address]   Office [phone], Fax [phone]      History & Physical    Name: Daquan Carney MRN: [id number]  SSN: [id number]    YOB: 1995  Age: 25 y.o. Sex: female      Subjective:     Reason for Admission:  Pregnancy and vaginal discharge    History of Present Illness: Ms. Mario Alberto Recinos is a 25 y.o.  female with an estimated gestational age of 36w7d with Estimated Date of Delivery: 3/31/23. Patient was sent from clinic earlier today for r/o SROM. She reported intermittent abd pain since 5 AM this morning and vaginal discharge that is brown in color that had a mucous-like texture. She denies a big gush of fluid or leaking fluid throughout the day. All that she needed to absorb the fluid was a maxi pad. She is mainly concerned for the health of her baby. Pregnancy has been complicated by late to care (patient recently immigrated from [place] 5 mo ago), Chl + (KASIA pending). She reports that she was compliant with the 1g Azithro dose. Patient denies chest pain, headache , nausea and vomiting, right upper quadrant pain  , shortness of breath, swelling, and visual disturbances. OB History    Para Term  AB Living   1 0           SAB IAB Ectopic Molar Multiple Live Births                    # Outcome Date GA Lbr Asif/2nd Weight Sex Delivery Anes PTL Lv   1 Current              No past medical history on file. No past surgical history on file. Social History     Occupational History    Not on file   Tobacco Use    Smoking status: Never    Smokeless tobacco: Never   Vaping Use    Vaping Use: Never used   Substance and Sexual Activity    Alcohol use: Never    Drug use: Never    Sexual activity: Not on file      No family history on file. No Known Allergies  Prior to Admission medications    Medication Sig Start Date End Date Taking? Authorizing Provider   PNV no.121-iron-folic acid 28 mg iron- 800 mcg tab Take 1 Tablet by mouth daily.  23 Suri Rangel MD        Review of Systems:  A comprehensive review of systems was negative except for that written in the History of Present Illness. Objective:     Vitals:    Vitals:    23 1601 23 1602   BP:  126/83   Pulse:  84   Resp:  16   Temp:  98 °F (36.7 °C)   SpO2:  100%   Weight: 125 lb (56.7 kg)    Height: 4' 11.75\" (1.518 m)       Temp (24hrs), Av.8 °F (36.6 °C), Min:97.6 °F (36.4 °C), Max:98 °F (36.7 °C)    BP  Min: 126/80  Max: 130/82     Physical Exam:  Patient without distress. Heart: Regular rate and rhythm, S1S2 present, or without murmur or extra heart sounds  Lung: clear to auscultation throughout lung fields, no wheezes, no rales, no rhonchi, and normal respiratory effort  Abdomen: soft, nontender  Lower Extremities:  - Edema No     Membranes:  Intact  Speculum Exam: no pooling or fluid coming from the cervical os  Fetal Heart Rate:  Reactive  Baseline: 130 per minute  Variability: moderate  Accelerations: yes  Decelerations: none  Uterine contractions: none, some irregular uterine irritability     Bedside U/S: normal GUILLERMINA    Lab/Data Review:  No results found for this or any previous visit (from the past 24 hour(s)). Assessment and Plan:     Ms. Mario Alberto Recinos is a 25 y.o.   female with an estimated gestational age of 36w7d who is being evaluated for r/o SROM, labor. R/o SROM: Nitrazine positive in clinic, however this presumed to be a false positive as nitrazine made contact with blood-tinged discharge. Patient's story overall did not align with ROM, there was no pooling on speculum exam, and bedside U/S demonstrated normal GUILLERMINA. - Reassurance was provided to patient, and all questions were answered. I have discussed the diagnosis with the patient and the intended plan as seen in the above orders. All questions were answered concerning future plans. I have discussed medication side effects and warnings with the patient as well.    Labor precautions discussed, including: Regular painful contractions, lasting for greater than one hour, taking your breath away; any vaginal bleeding; any leakage of fluid; or absent or decreased fetal movement. Call M.D. on call if any of these symptoms or signs occur. Patient seen and discussed with Dr. Little Gao.     Hyman Schilder, MD  Family Medicine Resident

## 2023-03-10 ENCOUNTER — ANESTHESIA (OUTPATIENT)
Dept: LABOR AND DELIVERY | Age: 28
End: 2023-03-10

## 2023-03-10 ENCOUNTER — ANESTHESIA EVENT (OUTPATIENT)
Dept: LABOR AND DELIVERY | Age: 28
End: 2023-03-10

## 2023-03-10 LAB
ABO + RH BLD: NORMAL
BLOOD GROUP ANTIBODIES SERPL: NORMAL
SPECIMEN EXP DATE BLD: NORMAL

## 2023-03-10 PROCEDURE — 77030005513 HC CATH URETH FOL11 MDII -B

## 2023-03-10 PROCEDURE — 75410000002 HC LABOR FEE PER 1 HR: Performed by: OBSTETRICS & GYNECOLOGY

## 2023-03-10 PROCEDURE — 75410000003 HC RECOV DEL/VAG/CSECN EA 0.5 HR: Performed by: OBSTETRICS & GYNECOLOGY

## 2023-03-10 PROCEDURE — 75410000000 HC DELIVERY VAGINAL/SINGLE: Performed by: OBSTETRICS & GYNECOLOGY

## 2023-03-10 PROCEDURE — 59409 OBSTETRICAL CARE: CPT | Performed by: OBSTETRICS & GYNECOLOGY

## 2023-03-10 PROCEDURE — 74011250636 HC RX REV CODE- 250/636: Performed by: OBSTETRICS & GYNECOLOGY

## 2023-03-10 PROCEDURE — 77030019905 HC CATH URETH INTMIT MDII -A

## 2023-03-10 PROCEDURE — 74011250637 HC RX REV CODE- 250/637: Performed by: OBSTETRICS & GYNECOLOGY

## 2023-03-10 PROCEDURE — 77030014125 HC TY EPDRL BBMI -B: Performed by: ANESTHESIOLOGY

## 2023-03-10 PROCEDURE — 59025 FETAL NON-STRESS TEST: CPT

## 2023-03-10 PROCEDURE — 74011000250 HC RX REV CODE- 250: Performed by: ANESTHESIOLOGY

## 2023-03-10 PROCEDURE — 74011250636 HC RX REV CODE- 250/636: Performed by: ANESTHESIOLOGY

## 2023-03-10 PROCEDURE — 74011000250 HC RX REV CODE- 250

## 2023-03-10 PROCEDURE — 0KQM0ZZ REPAIR PERINEUM MUSCLE, OPEN APPROACH: ICD-10-PCS | Performed by: OBSTETRICS & GYNECOLOGY

## 2023-03-10 PROCEDURE — 65270000029 HC RM PRIVATE

## 2023-03-10 PROCEDURE — 74011250636 HC RX REV CODE- 250/636

## 2023-03-10 PROCEDURE — 74011000258 HC RX REV CODE- 258: Performed by: OBSTETRICS & GYNECOLOGY

## 2023-03-10 PROCEDURE — 76060000078 HC EPIDURAL ANESTHESIA: Performed by: ANESTHESIOLOGY

## 2023-03-10 PROCEDURE — 99284 EMERGENCY DEPT VISIT MOD MDM: CPT

## 2023-03-10 RX ORDER — OXYTOCIN/RINGER'S LACTATE 30/500 ML
10 PLASTIC BAG, INJECTION (ML) INTRAVENOUS AS NEEDED
Status: DISCONTINUED | OUTPATIENT
Start: 2023-03-10 | End: 2023-03-12 | Stop reason: HOSPADM

## 2023-03-10 RX ORDER — IBUPROFEN 800 MG/1
800 TABLET ORAL EVERY 8 HOURS
Status: DISCONTINUED | OUTPATIENT
Start: 2023-03-10 | End: 2023-03-12 | Stop reason: HOSPADM

## 2023-03-10 RX ORDER — OXYTOCIN/RINGER'S LACTATE 30/500 ML
87.3 PLASTIC BAG, INJECTION (ML) INTRAVENOUS AS NEEDED
Status: DISCONTINUED | OUTPATIENT
Start: 2023-03-10 | End: 2023-03-12 | Stop reason: HOSPADM

## 2023-03-10 RX ORDER — NALOXONE HYDROCHLORIDE 0.4 MG/ML
0.4 INJECTION, SOLUTION INTRAMUSCULAR; INTRAVENOUS; SUBCUTANEOUS AS NEEDED
Status: DISCONTINUED | OUTPATIENT
Start: 2023-03-10 | End: 2023-03-12 | Stop reason: HOSPADM

## 2023-03-10 RX ORDER — DOCUSATE SODIUM 100 MG/1
100 CAPSULE, LIQUID FILLED ORAL
Status: DISCONTINUED | OUTPATIENT
Start: 2023-03-10 | End: 2023-03-12 | Stop reason: HOSPADM

## 2023-03-10 RX ORDER — SODIUM CHLORIDE 0.9 % (FLUSH) 0.9 %
5-40 SYRINGE (ML) INJECTION AS NEEDED
Status: DISCONTINUED | OUTPATIENT
Start: 2023-03-10 | End: 2023-03-12 | Stop reason: HOSPADM

## 2023-03-10 RX ORDER — EPHEDRINE SULFATE/0.9% NACL/PF 50 MG/5 ML
10 SYRINGE (ML) INTRAVENOUS
Status: COMPLETED | OUTPATIENT
Start: 2023-03-10 | End: 2023-03-10

## 2023-03-10 RX ORDER — FENTANYL/BUPIVACAINE/NS/PF 2-1250MCG
1-16 SYRINGE (ML) EPIDURAL CONTINUOUS
Status: DISCONTINUED | OUTPATIENT
Start: 2023-03-10 | End: 2023-03-10 | Stop reason: HOSPADM

## 2023-03-10 RX ORDER — HYDROCORTISONE ACETATE PRAMOXINE HCL 2.5; 1 G/100G; G/100G
CREAM TOPICAL AS NEEDED
Status: DISCONTINUED | OUTPATIENT
Start: 2023-03-10 | End: 2023-03-10 | Stop reason: CLARIF

## 2023-03-10 RX ORDER — SODIUM CHLORIDE 0.9 % (FLUSH) 0.9 %
5-40 SYRINGE (ML) INJECTION EVERY 8 HOURS
Status: DISCONTINUED | OUTPATIENT
Start: 2023-03-10 | End: 2023-03-12 | Stop reason: HOSPADM

## 2023-03-10 RX ORDER — EPHEDRINE SULFATE/0.9% NACL/PF 50 MG/5 ML
SYRINGE (ML) INTRAVENOUS
Status: COMPLETED
Start: 2023-03-10 | End: 2023-03-10

## 2023-03-10 RX ORDER — FOLIC ACID/MULTIVIT,IRON,MINER 0.4MG-18MG
1 TABLET ORAL DAILY
Status: DISCONTINUED | OUTPATIENT
Start: 2023-03-10 | End: 2023-03-12 | Stop reason: HOSPADM

## 2023-03-10 RX ORDER — LIDOCAINE HYDROCHLORIDE AND EPINEPHRINE 15; 5 MG/ML; UG/ML
INJECTION, SOLUTION EPIDURAL
Status: SHIPPED | OUTPATIENT
Start: 2023-03-10 | End: 2023-03-10

## 2023-03-10 RX ORDER — SODIUM CHLORIDE, SODIUM LACTATE, POTASSIUM CHLORIDE, CALCIUM CHLORIDE 600; 310; 30; 20 MG/100ML; MG/100ML; MG/100ML; MG/100ML
125 INJECTION, SOLUTION INTRAVENOUS CONTINUOUS
Status: DISCONTINUED | OUTPATIENT
Start: 2023-03-10 | End: 2023-03-10 | Stop reason: HOSPADM

## 2023-03-10 RX ORDER — IBUPROFEN 800 MG/1
800 TABLET ORAL EVERY 8 HOURS
Status: DISCONTINUED | OUTPATIENT
Start: 2023-03-10 | End: 2023-03-10

## 2023-03-10 RX ADMIN — Medication 10 MG: at 01:00

## 2023-03-10 RX ADMIN — SODIUM CHLORIDE, POTASSIUM CHLORIDE, SODIUM LACTATE AND CALCIUM CHLORIDE 1000 ML: 600; 310; 30; 20 INJECTION, SOLUTION INTRAVENOUS at 01:00

## 2023-03-10 RX ADMIN — SODIUM CHLORIDE, POTASSIUM CHLORIDE, SODIUM LACTATE AND CALCIUM CHLORIDE 125 ML/HR: 600; 310; 30; 20 INJECTION, SOLUTION INTRAVENOUS at 01:56

## 2023-03-10 RX ADMIN — LIDOCAINE HYDROCHLORIDE AND EPINEPHRINE 3 ML: 15; 5 INJECTION, SOLUTION EPIDURAL; INFILTRATION; INTRACAUDAL; PERINEURAL at 00:32

## 2023-03-10 RX ADMIN — IBUPROFEN 800 MG: 800 TABLET, FILM COATED ORAL at 19:56

## 2023-03-10 RX ADMIN — SODIUM CHLORIDE 2.5 MILLION UNITS: 9 INJECTION, SOLUTION INTRAVENOUS at 03:33

## 2023-03-10 RX ADMIN — LIDOCAINE HYDROCHLORIDE AND EPINEPHRINE 4 ML: 15; 5 INJECTION, SOLUTION EPIDURAL; INFILTRATION; INTRACAUDAL; PERINEURAL at 00:45

## 2023-03-10 RX ADMIN — IBUPROFEN 800 MG: 800 TABLET, FILM COATED ORAL at 11:55

## 2023-03-10 RX ADMIN — SODIUM CHLORIDE 2.5 MILLION UNITS: 9 INJECTION, SOLUTION INTRAVENOUS at 07:28

## 2023-03-10 RX ADMIN — OXYTOCIN 10000 MILLI-UNITS: 10 INJECTION, SOLUTION INTRAMUSCULAR; INTRAVENOUS at 09:46

## 2023-03-10 RX ADMIN — Medication 10 ML/HR: at 01:51

## 2023-03-10 RX ADMIN — SODIUM CHLORIDE, POTASSIUM CHLORIDE, SODIUM LACTATE AND CALCIUM CHLORIDE 1000 ML: 600; 310; 30; 20 INJECTION, SOLUTION INTRAVENOUS at 00:01

## 2023-03-10 NOTE — PROGRESS NOTES
0700 Bedside SBAR from Mis Lpoez RN. Assumed care of patient. This RN orienting RAHUL Nair RN and overseeing patient care and charting.  [address] #[id number]

## 2023-03-10 NOTE — PROGRESS NOTES
Labor Progress Note  Patient seen, fetal heart rate and contraction pattern evaluated, patient examined. Patient Vitals for the past 1 hrs:   BP Pulse SpO2   03/10/23 0737 -- -- 98 %   03/10/23 0733 -- -- 91 %   03/10/23 0729 (!) 103/55 81 --   03/10/23 0727 -- -- 100 %         Physical Exam:  Cervical Exam:  Cervical Exam  Dilation (cm): 100  Eff: 100 %  Station: +1  Vaginal exam done by? : Yaritza Butler RN  Membrane Status: AROM - clear fluid at 6 AM  Membranes:  Intact  Uterine Activity: Frequency: Every 1-2 minutes and Duration: ~60 seconds  Fetal Heart Rate: Reactive  Baseline: 145 per minute  Variability: moderate  Accelerations: yes  Decelerations: none    Assessment/Plan     Ms. Mya Ji is a 25 y.o.   female with an estimated gestational age of 36w7d who is admitted for active labor.      Active Labor: Patient now complete.  - GBS status unknown, on PCN for prophylaxis  - Pain control: s/p epidural  - NPO w sips  - Anticipate        Pt discussed with Dr. Sarah Pearl MD  [facility] Practice Resident PGY-1

## 2023-03-10 NOTE — PROGRESS NOTES
Labor Progress Note  Patient seen, fetal heart rate and contraction pattern evaluated, patient examined. Patient Vitals for the past 1 hrs:   BP Pulse SpO2   03/10/23 0551 -- -- 99 %   03/10/23 0546 -- -- 99 %   03/10/23 0544 104/60 88 --   03/10/23 0541 -- -- 99 %   03/10/23 0536 -- -- 99 %   03/10/23 0531 -- -- 99 %   03/10/23 0529 109/60 86 --   03/10/23 0526 -- -- 99 %   03/10/23 0521 -- -- 99 %       Physical Exam:  Cervical Exam:  Cervical Exam  Dilation (cm): 90  Eff: 100 %  Station: 0  Vaginal exam done by? : Shane Jansen Status: AROM - clear fluid  Membranes:  Intact  Uterine Activity: Frequency: Every 1-2 minutes and Duration: ~60 seconds  Fetal Heart Rate: Reactive  Baseline: 140 per minute  Variability: moderate  Accelerations: yes  Decelerations: none    Assessment/Plan     Ms. Gerson Wright is a 25 y.o.   female with an estimated gestational age of 36w7d who is admitted for active labor. Active Labor: SVE 90/100%/0 at 6 AM, s/p AROM  - GBS status unknown, on PCN for prophylaxis  - Pain control: s/p epidural  - NPO w sips  - Anticipate      2. Late to prenatal care: moved from [place] 6 months ago. She was not aware she was pregnant.   - UDS negative     3. Positive Chlamydia: During pregnancy.  S/p treatment with Azithromycin 1g  - KASIA cure pending; chlamydia labs pending    Pt discussed with Dr. Santos Benítez [facility]   Eloina Bell MD  [facility] Practice Resident PGY-1

## 2023-03-10 NOTE — LACTATION NOTE
This note was copied from a baby's chart. Pt has breast fed her baby well since delivery.  [facility] educated and encouraged mother to feed on demand or every 2-3 hours.  [facility] educated mother on hand expression, looking for signs of hunger, and how to wake a sleepy baby. Pt to call for further lactation assistance. Breastfeeding log explained and given to pt to fill out. Breastfeeding booklet in Hungarian provided. Discussed with mother her plan for feeding. Reviewed the benefits of exclusive breast milk feeding during the hospital stay. Informed her of the risks of using formula to supplement in the first few days of life as well as the benefits of successful breast milk feeding; referred her to the Breastfeeding booklet about this information. She acknowledges understanding of information reviewed and states that it is her plan to breast and formula feed her infant. Will support her choice and offer additional information as needed. Reviewed breastfeeding basics:  How milk is made and normal  breastfeeding behaviors discussed. Supply and demand,  stomach size, early feeding cues, skin to skin bonding with comfortable positioning and baby led latch-on reviewed. How to identify signs of successful breastfeeding sessions reviewed; education on asymetrical latch, signs of effective latching vs shallow, in-effective latching, normal  feeding frequency and duration and expected infant output discussed. Normal course of breastfeeding discussed including the AAP's recommendation that children receive exclusive breast milk feedings for the first six months of life with breast milk feedings to continue through the first year of life and/or beyond as complimentary table foods are added. Breastfeeding Booklet and Warm line information provided with discussion.   Discussed typical  weight loss and the importance of pediatrician appointment within 24-48 hours of discharge, at 2 weeks of life and normalcy of requesting pediatric weight checks as needed in between visits. Pt will successfully establish breastfeeding by feeding in response to early feeding cues   or wake every 3h, will obtain deep latch, and will keep log of feedings/output. Taught to BF at hunger cues and or q 2-3 hrs and to offer 10-20 drops of hand expressed colostrum at any non-feeds.          Breast- Feeding Assessment  Attends Breast-Feeding Classes: No  Breast-Feeding Experience: No  Breast Trauma/Surgery: No  Type/Quality: Good  Lactation Consultant Visits  Breast-Feedings:  (pt to call)     LATCH Documentation  Latch: Repeated attempts, hold nipple in mouth, stimulate to suck  Audible Swallowing: None  Type of Nipple: Everted (after stimulation)  Comfort (Breast/Nipple): Soft/non-tender  Hold (Positioning): Full assist, teach one side, mother does other, staff holds  [facility] Score: 6

## 2023-03-10 NOTE — L&D DELIVERY NOTE
Delivery Summary    Patient: Siobhan Barcenas MRN: [id number]  SSN: [id number]    YOB: 1995  Age: 25 y.o. Sex: female       Information for the patient's :  Caitlin Vanessa [[id number]]     Labor Events:    Labor: No    Steroids: None   Cervical Ripening Date/Time:       Cervical Ripening Type: None   Antibiotics During Labor: Yes   Rupture Identifier:      Rupture Date/Time: 3/10/2023 6:12 AM   Rupture Type: AROM   Amniotic Fluid Volume: Moderate    Amniotic Fluid Description: Clear    Amniotic Fluid Odor: None    Induction: None       Induction Date/Time:        Indications for Induction:      Augmentation: AROM   Augmentation Date/Time: 3/10/09582:12 AM   Indications for Augmentation:     Labor complications: None       Additional complications:        Delivery Events:  Indications For Episiotomy:     Episiotomy: None   Perineal Laceration(s): 2nd   Repaired: Yes   Periurethral Laceration Location:      Repaired:     Labial Laceration Location:     Repaired:     Sulcal Laceration Location:     Repaired:     Vaginal Laceration Location:     Repaired:     Cervical Laceration Location:     Repaired:     Repair Suture: Vicryl 3-0   Number of Repair Packets: 1   Estimated Blood Loss (ml):  ml   Quantitative Blood Loss (ml)                Delivery Date: 3/10/2023    Delivery Time: 9:36 AM  Delivery Type: Vaginal, Spontaneous  Sex:  Female    Gestational Age: 37w0d   Delivery Clinician:  Don Pace  Living Status: Living   Delivery Location: L&D            APGARS  One minute Five minutes Ten minutes   Skin color: 1   1        Heart rate: 2   2        Grimace: 2   2        Muscle tone: 2   2        Breathin   2        Totals: 9   9            Presentation: Vertex    Position:   Occiput Anterior  Resuscitation Method:  Suctioning-bulb; Tactile Stimulation     Meconium Stained: None      Cord Information: 3 Vessels  Complications: Nuchal Cord Without Compressions  Cord around: Delayed cord clamping? Yes  Cord clamped date/time:3/10/2023  9:37 AM  Disposition of Cord Blood: Lab    Blood Gases Sent?: No    Placenta:  Date/Time: 3/10/2023  9:44 AM  Removal: Expressed      Appearance: Normal     [place] Measurements:  Birth Weight:        Birth Length:        Head Circumference:        Chest Circumference:       Abdominal Girth: Other Providers:   ALLITA Klein;ELIECER CHOUDHARY;SCOTTY HEREDIA;MANASA DEL VALLE;BIN ARRIAGA;EDGAR RANDOLPH;SHANNON BALLESTEROS;JAKE MACIAS;ARVIND SNOW, Obstetrician;Primary Nurse;Primary [place] Nurse;Staff Nurse;Charge Nurse;Resident; Resident;Care Manager;Scrub Tech         Group B Strep: No results found for: GRBSEXT, GRBSEXT  Information for the patient's :  Rai Emerson [[id number]]   No results found for: ABORH, PCTABR, PCTDIG, BILI, ABORHEXT, ABORH   No results for input(s): PCO2CB, PO2CB, HCO3I, SO2I, IBD, PTEMPI, SPECTI, PHICB, ISITE, IDEV, IALLEN in the last 72 hours.

## 2023-03-10 NOTE — PROGRESS NOTES
2250: Pt arrives to unit complaining of contractions that started at 1300 today. Pt reports some spotting. Pt denies LOF and confirms feeling fetal movement. Pt was here in L&D earlier today and had an ultrasound and speculum exam. Pt was cleared for discharge today.  Maxine Ruiz being use (#[id number]). 2304: This RN and EFREN Gallego at bedside. SVE 6/90/0 with a bulging bag performed by EFREN Gallego RN.    2203: Pt sitting up on side of bed for epidural placement.  Arkimedianes #[id number] used. 0110: Dr. Alexi Vargas and this RN at bedside SVE performed 7/90/0. Pt tolerated well. BOW still intact. 0330: Dr. Alexi Vargas at bedside evaluating pt. Pt denies feeling pressure. 0600: Dr. Meng Mendoza, Dr. Alexi Vargas, and Dr. Kobi Arellano at bedside. SVE performed 9/100/0. BOW still intact. POC to AROM being discussed with pt. Pt agreeable to plan.  Clara Spurling #[id number] used. 4100: AROM performed by Dr. Meng Mendoza. Small amount of clear fluid noted on pad.     0700: Bedside and Verbal shift change report given to EDGAR Stauffer with rome Mccormick (oncoming nurse) by Carina Garcia RN (offgoing nurse). Report included the following information SBAR, Kardex, Intake/Output, MAR, Accordion, and Recent Results.

## 2023-03-10 NOTE — ANESTHESIA PROCEDURE NOTES
Epidural Block    Patient location during procedure: OB  Start time: 3/10/2023 12:32 AM  End time: 3/10/2023 12:45 AM  Reason for block: labor epidural  Staffing  Performed: attending   Anesthesiologist: Adolfo French MD  Preanesthetic Checklist  Completed: patient identified, IV checked, site marked, risks and benefits discussed, surgical consent, monitors and equipment checked, pre-op evaluation, timeout performed and fire risk safety assessment completed and verbalized  Block Placement  Patient position: sitting  Prep: Betadine  Sterility prep: cap, gloves and mask  Sedation level: no sedation  Patient monitoring: frequent blood pressure checks, continuous pulse oximetry and heart rate  Approach: midline  Location: lumbar  Lumbar location: L3-L4  Epidural  Loss of resistance technique: air  Guidance: landmark technique  Needle  Needle type: Tuohy   Needle gauge: 17 G  Needle length: 9 cm  Needle insertion depth: 4 cm  Catheter type: multi-orifice  Catheter size: 20 G  Catheter at skin depth: 9 cm  Catheter securement method: clear occlusive dressing and surgical tape  Test dose: negative  Medications Administered  lidocaine-EPINEPHrine (XYLOCAINE) 1.5 %-1:200,000 Epidural - Epidural   3 mL - 3/10/2023 12:32:00 AM  Assessment  Block outcome: pain improved  Number of attempts: 1  Events: blood pressure change  Procedure assessment: patient tolerated procedure well with no immediate complications  Additional Notes  Tolerated well, voiced relief , PCEA explained   After 4 cc load of 1.5% lido , BP decreased , epedrine given by RN   BP improved   See TD /RN noted for Bps and FHTs

## 2023-03-10 NOTE — H&P
[address] [facility] [address] [place] [address]   Office [phone], Fax [phone]      History & Physical    Name: Jasmine Ramires MRN: [id number]  SSN: [id number]    YOB: 1995  Age: 25 y.o. Sex: female      Subjective:     Reason for Admission:  Pregnancy and Contractions and  Labor    History of Present Illness: Ms. Mya Ji is a 25 y.o.   female with an estimated gestational age of 36w7d with Estimated Date of Delivery: 3/31/23. Patient complains of moderate abdominal pain, moderate contractions, and mild vaginal bleeding since 4 AM today. Pregnancy has been complicated by late to prenatal care, and chlamydia positive s/p treatment. Patient denies chest pain, fever, nausea and vomiting, right upper quadrant pain  , shortness of breath, swelling, vaginal leaking of fluid , and visual disturbances. OB History    Para Term  AB Living   1 0           SAB IAB Ectopic Molar Multiple Live Births                    # Outcome Date GA Lbr Asif/2nd Weight Sex Delivery Anes PTL Lv   1 Current              Past Medical History:   Diagnosis Date    Chlamydia      No past surgical history on file. Social History     Occupational History    Not on file   Tobacco Use    Smoking status: Never    Smokeless tobacco: Never   Vaping Use    Vaping Use: Never used   Substance and Sexual Activity    Alcohol use: Never    Drug use: Never    Sexual activity: Not on file      No family history on file. No Known Allergies  Prior to Admission medications    Medication Sig Start Date End Date Taking? Authorizing Provider   PNV no.121-iron-folic acid 28 mg iron- 800 mcg tab Take 1 Tablet by mouth daily. 23  Yes Quita Bishop MD        Review of Systems:  A comprehensive review of systems was negative except for that written in the History of Present Illness. Objective:     Vitals: There were no vitals filed for this visit.    Temp (24hrs), Av.8 °F (36.6 °C), Min:97.6 °F (36.4 °C), Max:98 °F (36.7 °C)    BP  Min: 126/80  Max: 130/82     Physical Exam:  Patient without distress. Heart: Regular rate and rhythm or S1S2 present  Lung: clear to auscultation throughout lung fields, no wheezes, no rales, no rhonchi, and normal respiratory effort  Abdomen: soft, nontender  Fundus: soft and non tender  Cervical Exam: 6 cm dilated    90% effaced    0 station    Lower Extremities:  - Edema No     Membranes:  Intact  Uterine Activity:  Date/time of onset: 4 AM 3/9/23  Frequency: Every 1-2 minutes   Fetal Heart Rate:  Reactive  Baseline: 150 per minute  Variability: moderate  Accelerations: yes  Decelerations: none       Lab/Data Review:  No results found for this or any previous visit (from the past 24 hour(s)). Assessment and Plan:     Ms. Gerson Wright is a 25 y.o.   female with an estimated gestational age of 36w7d who is admitted for active labor. Active Labor: SVE 6/90%/0 at 11 PM  - SIUP: PNL: O+, Ab neg, Hgb frac wnl, G neg, C pos, VZV/Rubella immune, RPR/HepC/HepB/HIV nr. Ucx NG. UDS neg. 1hr gtt wnl, PAP NILM, s/p Tdap/flu  - US: at 36w5d EFW: 2,237g (35w5d), 3 vessel cord, posterior placenta, normal anatomy, female, cephalic, normal GUILLERMINA  - GBS status unknown  - Started patient on PCN for prophylaxis  - Desires an epidural  - Consulted anesthesiology    2. Late to prenatal care: moved from [place] 6 months ago. She was not aware she was pregnant.   - UDS negative    3. Positive Chlamydia: During pregnancy. S/p treatment with Azithromycin 1g  - KASIA cure pending; chlamydia labs pending        I have discussed the diagnosis with the patient and the intended plan as seen in the above orders. All questions were answered concerning future plans. I have discussed medication side effects and warnings with the patient as well.    Labor precautions discussed, including: Regular painful contractions, lasting for greater than one hour, taking your breath away; any vaginal bleeding; any leakage of fluid; or absent or decreased fetal movement. Call M.D. on call if any of these symptoms or signs occur.       Patient discussed with Dr. Pelon Sanchez)    Huy Leger MD  Family Medicine Resident PGY-1

## 2023-03-10 NOTE — PROGRESS NOTES
3/10/2023  10:47 AM    CM met with CONNER to complete initial assessment and begin discharge planning. MOB verified and confirmed demographics. CONNER lives with her sister,  at the address on file. CONNER is not employed. CONNER arrived about 5mos ago from [facility]. CONNER reports that [place] is in [facility]. CONNER reports she has good family support here with her sister and brother, and feels like she has the support she needs when she returns home. CONNER plans to breast and bottle feed baby. [facility] will provide follow up care for infant. CONNER has car seat, bassinet/crib, clothing, bottles and all necessary supplies for baby. Care Management Interventions  PCP Verified by CM: Yes ([facility])  Mode of Transport at Discharge:  Other (see comment)  Transition of Care Consult (CM Consult): Discharge Planning  Support Systems: Other Family Member(s)  Confirm Follow Up Transport: Family  Discharge Location  Patient Expects to be Discharged to[de-identified] Home with family assistance  Judah Dakin

## 2023-03-10 NOTE — PROGRESS NOTES
Labor Progress Note  Patient seen, fetal heart rate and contraction pattern evaluated, patient examined. Patient Vitals for the past 1 hrs:   BP Temp Pulse Resp SpO2   03/10/23 0344 107/63 -- 66 -- --   03/10/23 0341 -- -- -- -- 99 %   03/10/23 0336 -- -- -- -- 99 %   03/10/23 0331 -- -- -- -- 98 %   03/10/23 0329 119/72 -- 71 -- --   03/10/23 0326 -- -- -- -- 99 %   03/10/23 0321 -- -- -- -- 98 %   03/10/23 0316 120/70 98.1 °F (36.7 °C) 63 17 98 %   03/10/23 0311 -- -- -- -- 98 %   03/10/23 0306 -- -- -- -- 98 %   03/10/23 0301 -- -- -- -- 98 %   03/10/23 0259 104/68 -- 86 -- --   03/10/23 0256 -- -- -- -- 98 %   03/10/23 0251 -- -- -- -- 98 %       Physical Exam:  Cervical Exam:  Cervical Exam  Dilation (cm): 7  Eff: 90 %  Station: 0  Vaginal exam done by? : Tomy Springer RN  Membrane Status: Intact  Membranes:  Intact  Uterine Activity: Frequency: Every 1-2 minutes and Duration: ~60 seconds  Fetal Heart Rate: Reactive  Baseline: 140 per minute  Variability: moderate  Accelerations: yes  Decelerations: none    Assessment/Plan     Ms. Jessica Mendoza is a 25 y.o.   female with an estimated gestational age of 36w7d who is admitted for active labor. Active Labor: SVE 6/90%/0 at 11 PM  - SIUP: PNL: O+, Ab neg, Hgb frac wnl, G neg, C pos, VZV/Rubella immune, RPR/HepC/HepB/HIV nr. Ucx NG. UDS neg. 1hr gtt wnl, PAP NILM, s/p Tdap/flu  - US: at 36w5d EFW: 2,237g (35w5d), 3 vessel cord, posterior placenta, normal anatomy, female, cephalic, normal GUILLERMINA  - GBS status unknown  - Started patient on PCN for prophylaxis  - Desires an epidural  - Consulted anesthesiology     2. Late to prenatal care: moved from [place] 6 months ago. She was not aware she was pregnant.   - UDS negative     3. Positive Chlamydia: During pregnancy.  S/p treatment with Azithromycin 1g  - KASIA cure pending; chlamydia labs pending    Pt discussed with Dr. Krista Newman [facility]   Gallo Pandey MD  [facility] Practice Resident PGY-1

## 2023-03-10 NOTE — ANESTHESIA PREPROCEDURE EVALUATION
Relevant Problems   No relevant active problems       Anesthetic History   No history of anesthetic complications            Review of Systems / Medical History  Patient summary reviewed, nursing notes reviewed and pertinent labs reviewed    Pulmonary  Within defined limits                 Neuro/Psych              Cardiovascular  Within defined limits                     GI/Hepatic/Renal  Within defined limits              Endo/Other  Within defined limits           Other Findings              Physical Exam    Airway  Mallampati: I  TM Distance: 4 - 6 cm  Neck ROM: normal range of motion        Cardiovascular    Rhythm: regular  Rate: normal         Dental  No notable dental hx       Pulmonary  Breath sounds clear to auscultation               Abdominal         Other Findings            Anesthetic Plan    ASA: 2  Anesthesia type: epidural            Anesthetic plan and risks discussed with: Patient and Family       used

## 2023-03-10 NOTE — PROGRESS NOTES
Problem: Patient Education: Go to Patient Education Activity  Goal: Patient/Family Education  Outcome: Progressing Towards Goal     Problem: Vaginal Delivery: Day of Deliver-Laboring  Goal: Off Pathway (Use only if patient is Off Pathway)  Outcome: Progressing Towards Goal  Goal: Activity/Safety  Outcome: Progressing Towards Goal  Goal: Consults, if ordered  Outcome: Progressing Towards Goal  Goal: Diagnostic Test/Procedures  Outcome: Progressing Towards Goal  Goal: Nutrition/Diet  Outcome: Progressing Towards Goal  Goal: Discharge Planning  Outcome: Progressing Towards Goal  Goal: Medications  Outcome: Progressing Towards Goal  Goal: Respiratory  Outcome: Progressing Towards Goal  Goal: Treatments/Interventions/Procedures  Outcome: Progressing Towards Goal  Goal: *Vital signs within defined limits  Outcome: Progressing Towards Goal  Goal: *Labs within defined limits  Outcome: Progressing Towards Goal  Goal: *Hemodynamically stable  Outcome: Progressing Towards Goal  Goal: *Optimal pain control at patient's stated goal  Outcome: Progressing Towards Goal     Problem: Vaginal Delivery: Day of Delivery-Post delivery  Goal: Off Pathway (Use only if patient is Off Pathway)  Outcome: Progressing Towards Goal  Goal: Activity/Safety  Outcome: Progressing Towards Goal  Goal: Consults, if ordered  Outcome: Progressing Towards Goal  Goal: Nutrition/Diet  Outcome: Progressing Towards Goal  Goal: Discharge Planning  Outcome: Progressing Towards Goal  Goal: Medications  Outcome: Progressing Towards Goal  Goal: Treatments/Interventions/Procedures  Outcome: Progressing Towards Goal  Goal: *Vital signs within defined limits  Outcome: Progressing Towards Goal  Goal: *Labs within defined limits  Outcome: Progressing Towards Goal  Goal: *Hemodynamically stable  Outcome: Progressing Towards Goal  Goal: *Optimal pain control at patient's stated goal  Outcome: Progressing Towards Goal  Goal: *Participates in infant care  Outcome: Progressing Towards Goal  Goal: *Demonstrates progressive activity  Outcome: Progressing Towards Goal  Goal: *Tolerating diet  Outcome: Progressing Towards Goal     Problem: Vaginal Delivery: Postpartum Day 1  Goal: Off Pathway (Use only if patient is Off Pathway)  Outcome: Progressing Towards Goal  Goal: Activity/Safety  Outcome: Progressing Towards Goal  Goal: Consults, if ordered  Outcome: Progressing Towards Goal  Goal: Diagnostic Test/Procedures  Outcome: Progressing Towards Goal  Goal: Nutrition/Diet  Outcome: Progressing Towards Goal  Goal: Discharge Planning  Outcome: Progressing Towards Goal  Goal: Medications  Outcome: Progressing Towards Goal  Goal: Treatments/Interventions/Procedures  Outcome: Progressing Towards Goal  Goal: Psychosocial  Outcome: Progressing Towards Goal  Goal: *Vital signs within defined limits  Outcome: Progressing Towards Goal  Goal: *Labs within defined limits  Outcome: Progressing Towards Goal  Goal: *Hemodynamically stable  Outcome: Progressing Towards Goal  Goal: *Optimal pain control at patient's stated goal  Outcome: Progressing Towards Goal  Goal: *Participates in infant care  Outcome: Progressing Towards Goal  Goal: *Demonstrates progressive activity  Outcome: Progressing Towards Goal  Goal: *Performs self perineal care  Outcome: Progressing Towards Goal  Goal: *Appropriate parent-infant bonding  Outcome: Progressing Towards Goal  Goal: *Tolerating diet  Outcome: Progressing Towards Goal  Goal: *Performs self breast care  Outcome: Progressing Towards Goal     Problem: Vaginal Delivery: Postpartum 2  Goal: Off Pathway (Use only if patient is Off Pathway)  Outcome: Progressing Towards Goal  Goal: Activity/Safety  Outcome: Progressing Towards Goal  Goal: Consults, if ordered  Outcome: Progressing Towards Goal  Goal: Nutrition/Diet  Outcome: Progressing Towards Goal  Goal: Discharge Planning  Outcome: Progressing Towards Goal  Goal: Medications  Outcome: Progressing Towards Goal  Goal: Treatments/Interventions/Procedures  Outcome: Progressing Towards Goal  Goal: Psychosocial  Outcome: Progressing Towards Goal     Problem: Vaginal Delivery: Discharge Outcomes  Goal: *Verbalizes name, dosage, time, side effects, and number of days to continue medications  Outcome: Progressing Towards Goal  Goal: *Describes available resources and support systems  Outcome: Progressing Towards Goal  Goal: *No signs and symptoms of infection  Outcome: Progressing Towards Goal  Goal: *Birth certificate information completed  Outcome: Progressing Towards Goal  Goal: *Received and verbalizes understanding of discharge plan and instructions  Outcome: Progressing Towards Goal  Goal: *Vital signs within defined limits  Outcome: Progressing Towards Goal  Goal: *Labs within defined limits  Outcome: Progressing Towards Goal  Goal: *Hemodynamically stable  Outcome: Progressing Towards Goal  Goal: *Optimal pain control at patient's stated goal  Outcome: Progressing Towards Goal  Goal: *Participates in infant care  Outcome: Progressing Towards Goal  Goal: *Demonstrates progressive activity  Outcome: Progressing Towards Goal  Goal: *Appropriate parent-infant bonding  Outcome: Progressing Towards Goal  Goal: *Tolerating diet  Outcome: Progressing Towards Goal     Problem: Pressure Injury - Risk of  Goal: *Prevention of pressure injury  Description: Document Carlito Scale and appropriate interventions in the flowsheet. Outcome: Progressing Towards Goal  Note: Pressure Injury Interventions:  Sensory Interventions: Turn and reposition approx. every two hours (pillows and wedges if needed)    Moisture Interventions: Absorbent underpads    Activity Interventions: Pressure redistribution bed/mattress(bed type)    Mobility Interventions: Turn and reposition approx.  every two hours(pillow and wedges)    Nutrition Interventions: Offer support with meals,snacks and hydration

## 2023-03-11 PROCEDURE — 99231 SBSQ HOSP IP/OBS SF/LOW 25: CPT | Performed by: OBSTETRICS & GYNECOLOGY

## 2023-03-11 PROCEDURE — 74011250637 HC RX REV CODE- 250/637: Performed by: OBSTETRICS & GYNECOLOGY

## 2023-03-11 PROCEDURE — 65270000029 HC RM PRIVATE

## 2023-03-11 RX ADMIN — IBUPROFEN 800 MG: 800 TABLET, FILM COATED ORAL at 05:59

## 2023-03-11 RX ADMIN — IBUPROFEN 800 MG: 800 TABLET, FILM COATED ORAL at 23:41

## 2023-03-11 RX ADMIN — IBUPROFEN 800 MG: 800 TABLET, FILM COATED ORAL at 14:28

## 2023-03-11 NOTE — LACTATION NOTE
This note was copied from a baby's chart. Mother states that nursing is going well. She is feeding infant on demand or every 2-3 hours. LC assessed mother's nipples after complaint of a little pain with breastfeeding. They look normal and intact, lanolin was given. LC encouraged mother to practice laid back feeding and ensuring a deep latch with every feeding. Pt to call for further lactation assistance if needed. Reviewed breastfeeding basics:  How milk is made and normal  breastfeeding behaviors discussed. Supply and demand,  stomach size, early feeding cues, skin to skin bonding with comfortable positioning and baby led latch-on reviewed. How to identify signs of successful breastfeeding sessions reviewed; education on asymetrical latch, signs of effective latching vs shallow, in-effective latching, normal  feeding frequency and duration and expected infant output discussed. Normal course of breastfeeding discussed including the AAP's recommendation that children receive exclusive breast milk feedings for the first six months of life with breast milk feedings to continue through the first year of life and/or beyond as complimentary table foods are added. Breastfeeding Booklet and Warm line information provided with discussion. Discussed typical  weight loss and the importance of pediatrician appointment within 24-48 hours of discharge, at 2 weeks of life and normalcy of requesting pediatric weight checks as needed in between visits.       Care for sore/tender nipples discussed:  ways to improve positioning and latch practiced and discussed, hand express colostrum after feedings and let air dry, light application of lanolin, hydrogel pads, seek comfortable laid back feeding position, start feedings on least sore side first.    Pt will successfully establish breastfeeding by feeding in response to early feeding cues   or wake every 3h, will obtain deep latch, and will keep log of feedings/output. Taught to BF at hunger cues and or q 2-3 hrs and to offer 10-20 drops of hand expressed colostrum at any non-feeds. Breast Assessment  Left Breast: Medium  Left Nipple: Everted, Intact  Right Breast: Medium  Right Nipple: Everted, Intact  Breast- Feeding Assessment  Attends Breast-Feeding Classes: No  Breast-Feeding Experience: No  Breast Trauma/Surgery: No  Type/Quality: Good  Lactation Consultant Visits  Breast-Feedings:  (pt to call)     LATCH Documentation  Latch: Grasps breast, tongue down, lips flanged, rhythmic sucking  Audible Swallowing: Spontaneous and intermittent (24 hours old)  Type of Nipple: Everted (after stimulation)  Comfort (Breast/Nipple): Soft/non-tender  Hold (Positioning): No assist from staff, mother able to position/hold infant  LATCH Score: 10    Chart shows numerous feedings, void, stool WNL. Discussed importance of monitoring outputs and feedings on first week of life. Discussed ways to tell if baby is  getting enough breast milk, ie  voids and stools, change in color of stool, and return to birth wt within 2 weeks. Follow up with pediatrician visit for weight check in 1-2 days (per AAP guidelines.)  Encouraged to call Warm Line  [phone]  for any questions/problems that arise.  Mother also given breastfeeding support group dates and times for any future needs

## 2023-03-11 NOTE — PROGRESS NOTES
[address] [facility] [place] [address]   Office [phone], Fax [phone]                                Post-Partum Day Number 1 Progress Note    Patient doing well post-partum without significant complaint. Pain well controlled. Lochia minimal.  Tolerating diet. Ambulating. Voiding without difficulty. Passing flatus. No BM. Vitals:  Patient Vitals for the past 8 hrs:   BP Temp Pulse Resp SpO2   03/10/23 2357 110/60 98.4 °F (36.9 °C) 80 17 98 %     Temp (24hrs), Av.5 °F (36.9 °C), Min:97.8 °F (36.6 °C), Max:99.1 °F (37.3 °C)      Exam:  Patient without distress. CTAB, no w/r/r/c.               RRR, +S1 and S2, no m/r/g. Abdomen soft, fundus firm at level of umbilicus, nontender. Lower extremities:  No edema. No palpable cords or tenderness. Lab/Data Review:  No results found for this or any previous visit (from the past 12 hour(s)). Assessment and Plan:    Coleman Villeda is a 25 y.o.  s/p  at 37 weeks 0 days. Pregnancy was complicated by late to prenatal care and positive chlamydia s/p tx. Patient appears to be having uncomplicated post-partum course. Continue routine perineal care and maternal education. Plan discharge tomorrow if no problems occur.                  Evan Golden MD  Family Medicine Resident

## 2023-03-11 NOTE — PROGRESS NOTES
Post-Partum Progress Note    Patient doing well post-partum without significant complaint. She is voiding without difficulty, she reports normal lochia. Her pain is well controlled with oral pain medication. She is tolerating a general diet. Delivery information:  Information for the patient's :  Ludy Bruno [[id number]]   Delivery of a 6 lb 8.1 oz (2.95 kg) female infant via Vaginal, Spontaneous on 3/10/2023 at 9:36 AM  by Chip Oppenheim. Apgars were 9  and 9 . Vitals:  Patient Vitals for the past 8 hrs:   BP Temp Pulse Resp SpO2   23 0747 (!) 109/59 98.1 °F (36.7 °C) 70 17 96 %     Temp (24hrs), Av.4 °F (36.9 °C), Min:97.8 °F (36.6 °C), Max:99.1 °F (37.3 °C)    Visit Vitals  BP (!) 109/59 (BP 1 Location: Right upper arm, BP Patient Position: Sitting)   Pulse 70   Temp 98.1 °F (36.7 °C)   Resp 17   SpO2 96%   Breastfeeding Unknown       Exam:    General: Patient without distress. Abdomen: soft, fundus firm at level of umbilicus, nontender  Lower extremities: negative for cords or tenderness. Labs: No results found for this or any previous visit (from the past 24 hour(s)). Assessment:    1. Postpartum S/P spontaneous vaginal delivery   2. Patient doing well without significant complications    Plan:  1. Continue routine postpartum care  2. Routine perineal care and maternal education   3. Oral pain medications and bowel regimen as needed       4.  Pt seen independently  Rimma Benitez MD

## 2023-03-11 NOTE — ROUTINE PROCESS
Bedside and verbal shift change report given to LUCIUS Trujillo RN  oncoming nurse, as assigned, by [facility] nurse, Gabbie Dominguez RN. Report included SBAR, Kardex, I&Os, Recent Results, Procedures, MAR, and changes in patient status. Oncoming nurse and patient given opportunity for questions.

## 2023-03-12 VITALS
TEMPERATURE: 98.1 F | HEART RATE: 69 BPM | SYSTOLIC BLOOD PRESSURE: 108 MMHG | OXYGEN SATURATION: 98 % | DIASTOLIC BLOOD PRESSURE: 52 MMHG | RESPIRATION RATE: 16 BRPM

## 2023-03-12 LAB
BACTERIA SPEC CULT: NORMAL
SERVICE CMNT-IMP: NORMAL

## 2023-03-12 PROCEDURE — 74011250637 HC RX REV CODE- 250/637

## 2023-03-12 PROCEDURE — 99231 SBSQ HOSP IP/OBS SF/LOW 25: CPT | Performed by: OBSTETRICS & GYNECOLOGY

## 2023-03-12 PROCEDURE — 74011250637 HC RX REV CODE- 250/637: Performed by: OBSTETRICS & GYNECOLOGY

## 2023-03-12 RX ORDER — IBUPROFEN 800 MG/1
800 TABLET ORAL EVERY 8 HOURS
Qty: 30 TABLET | Refills: 0 | Status: SHIPPED | OUTPATIENT
Start: 2023-03-12

## 2023-03-12 RX ORDER — DOCUSATE SODIUM 100 MG/1
100 CAPSULE, LIQUID FILLED ORAL
Qty: 30 CAPSULE | Refills: 0 | Status: SHIPPED | OUTPATIENT
Start: 2023-03-12 | End: 2023-06-10

## 2023-03-12 RX ADMIN — Medication 1 TABLET: at 08:21

## 2023-03-12 RX ADMIN — IBUPROFEN 800 MG: 800 TABLET, FILM COATED ORAL at 08:21

## 2023-03-12 NOTE — LACTATION NOTE
This note was copied from a baby's chart. Mother and baby for discharge. Baby was latched on well and nursing vigorously during visit. Reviewed breastfeeding basics:  Supply and demand, breastfeed baby 8-12 times in 24 hours,  stomach size, early  Feeding cues, skin to skin, positioning and baby led latch-on, assymetrical latch with signs of good, deep latch vs shallow, feeding frequency and duration, and log sheet for tracking infant feedings and output. Breastfeeding Booklet and Warm line information given. Discussed typical  weight loss and the importance of infant weight checks with pediatrician 1-2 post discharge. Discussed eating a healthy diet. Instructed mother to eat a variety of foods in order to get a well balanced diet. She should consume an extra 500 calories per day (more than her non-pregnant requirement.) These extra calories will help provide energy needed for optimal breast milk production. Mother also encouraged to \"drink to thirst\" and it is recommended that she drink fluids such as water, fruit/vegetable juice. Nutritious snacks should be available so that she can eat throughout the day to help satisfy her hunger and maintain a good milk supply. Engorgement Care Guidelines:  Reviewed how milk is made and normal phases of milk production. Taught care of engorged breasts - physiologic breastfeeding encouraged with use of cool packs (no ice directly on skin). Consider use of NSAIDS where appropriate for discomfort and inflammation. Can employ light touch, lymphatic drainage techniques on tender grandular tissues. Anticipatory guidance shared. Mother will successfully establish breastfeeding by feeding in response to early feeding cues   or wake every 3h, will obtain deep latch, and will keep log of feedings/output. Taught to BF at hunger cues and or q 2-3 hrs and to offer 10-20 drops of hand expressed colostrum at any non-feeds.       Breast Assessment  Left Breast: Medium  Left Nipple: Everted, Intact  Right Breast: Medium  Right Nipple: Everted, Intact  Breast- Feeding Assessment  Attends Breast-Feeding Classes: No  Breast-Feeding Experience: No  Breast Trauma/Surgery: No  Type/Quality: Good  Lactation Consultant Visits  Breast-Feedings: Good  (Mother was breastfeeding baby on right breast in cradle hold - baby was nursing vigorously with good sucking bursts.)  Mother/Infant Observation  Mother Observation: Alignment, Holds breast, Breast comfortable, Close hold  Infant Observation: Audible swallows, Lips flanged, upper, Opens mouth, Breast tissue moves, Rhythmic suck, Latches nipple and aereolae, Lips flanged, lower  LATCH Documentation  Latch: Grasps breast, tongue down, lips flanged, rhythmic sucking  Audible Swallowing: A few with stimulation  Type of Nipple: Everted (after stimulation)  Comfort (Breast/Nipple): Soft/non-tender  Hold (Positioning): No assist from staff, mother able to position/hold infant  LATCH Score: 9     Chart shows numerous feedings, void, stool WNL. Discussed importance of monitoring outputs and feedings on first week of life. Discussed ways to tell if baby is  getting enough breast milk, ie  voids and stools, change in color of stool, and return to birth wt within 2 weeks. Follow up with pediatrician visit for weight check in 1-2 days (per AAP guidelines.)  Encouraged to call Warm Line  [phone]  for any questions/problems that arise.  Mother also given breastfeeding support group dates and times for any future needs

## 2023-03-12 NOTE — DISCHARGE INSTRUCTIONS
Patient Discharge Instructions    Zuly Rogers / [id number] : 1995    Admitted 3/9/2023 Discharged: 3/12/2023       Por favor tenga angela documento presente en giles ivette de seguimiento con giles médico primario. Primary care provider:  [facility] [address]    Discharging provider:  Scotty Bee MD  - Family Medicine Resident  Dr. Shae Wang attending          [address]:   labor without delivery, third trimester [O60.03]      [address]:     You will receive a call from [id number] [place] [address] for your follow up appointment in 2 weeks. Recibirá teresa llamada de la clínica de medicina familiar de [facility] para giles ivette de seguimiento en 2 semanas. Continue Tratamiento:  - Por favor continue Motrin 800 mg, 1 tableta cada 8 horas por los próximos 2 días, luego 1 tableta cada 8 horas mientras sea necesario para el dolor.  - Por favor continue Colace 100 mg para el estreñimiento, tome 1 tableta dos veces al día hasta que pueda defercar regularmente sin necesidad del medicamento. - Por favor continue tomando ileana vitaminas prenatales. Pruebas de seguimiento que necesita: N/A    Resultados pendientes:   En el momento de giles de lisa los resultados de las siguientes pruebas están pendiente:  N/A. Por favor discuta estos resultados con giles proveedor primario en giles ivette de seguimiento. Importante que [employer] siguientes síntomas: dolor de [place], falta de [place], [place], escalofríos, náusea, vómito, diarrea, cambios en giles estado mental, caídas, debilidad y sangrado. DIETA/que comer:   Regular    ACTIVIDAD FÍSICA:   Instrucciones de [facility]    No levante nada que sea más pesado que giles bebé por las próximas 6 semanas. No insertar nada por la vaginal por 6 semanas. Luego del parto por cesárea/ vaginal debe evitar tener relaciones sexuales por 6 semanas. Tendrá giles ivette de seguimiento posparto a las 6 semanas.  Puede manejar giles vehículo mientras no esté tomando Percocet ni ningún medicamento nárcotico (Motrin está portia). Cuidado de Herida:  llene el roge bottle con agua tibia y exprima hasta que salga un chorro de agua por la boquilla para ayudarle a limpiar el área perineal.      Entiendo que si surge algún problema cuando llegue a mi hogar puedo contactar a mi médico.    Me parra explicado las siguientes instrucciones y parra aclarado mis dudas. Entiendo y reconozco las instrucciones brindadas. Firma de médico / Enfermera                                                                 Fecha/Hora                                                                                                                                              Firma de paciente ó representante                                                         Fecha/Hora       Después del parto (período de posparto): Instrucciones de cuidado  After Your Delivery (the Postpartum Period): Care Instructions  Instrucciones de cuidado     Felicidades por el nacimiento de giles bebé. Al igual que el Monalisa, el tiempo con el recién nacido puede ser un momento de [place], Feliberto Stallion y agotamiento. Es posible que se sienta chatman al mirar la nicholas de giles pequeño bebé. También podría sentirse abrumada por giles nuevo ritmo de sueño y las nuevas responsabilidades. Al principio, los bebés suelen dormir susu el día y permanecen despiertos susu la noche. No tienen ningún patrón ni rutina. Podrían soledad gritos ahogados, sacudirse y despertarse, o parecer ricardo si tuvieran los ojos cruzados (bizcos). Todo esto es normal, e incluso la pueden hacer sonreír. Susu las primeras semanas siguientes al parto, trate de cuidarse portia.  Podría tardar de 4 a 6 semanas en volver a sentirse usted misma, y posiblemente más tiempo si le parra hecho Davey cesárea. Es probable que se sienta muy fatigada susu varias semanas. Noemi días estarán llenos de [place], brice también de mucha alegría. La atención de seguimiento es teresa parte clave de giles tratamiento y seguridad. Asegúrese de hacer y acudir a todas las citas, y llame a giles médico si está teniendo problemas. También es teresa buena idea saber los resultados de noemi exámenes y mantener teresa lista de los medicamentos que lakshmi. ¿Cómo puede cuidarse en el hogar? Cuide giles cuerpo después del parto  Utilice toallas sanitarias en vez de tampones para las pérdidas de [place] que podrían durar hasta 2 semanas. Alivie los cólicos con ibuprofeno (Advil, Motrin). Alivie el dolor de las hemorroides y la golden entre la vagina y el recto con compresas de hielo o de infusión de daniel Barnes (\"witch hazel\"). Alivie el estreñimiento bebiendo mucho líquido y comiendo alimentos ricos en fibra. Pregúntele a giles médico acerca de los ablandadores de heces de [place]. Límpiese con un chorrito suave de agua tibia de teresa botella en vez de hacerlo con papel higiénico.  [place] un baño de asiento en agua tibia varias veces al día. Use un buen sostén de lactancia. Alivie el dolor y la hinchazón de los senos con toallitas de aseo húmedas tibias. Si no está amamantando, use hielo en vez de calor para la sensibilidad en los senos. Si está amamantando, giles período menstrual podría no comenzar hasta después de varios meses. Es posible que, al principio, sven más y [place] de lo que lo hacía antes del Nickolas Nestle. Espere a que haya sanado (alrededor de 4 a 6 semanas) antes de tener relaciones sexuales. Pregúntele a giles médico cuándo está portia que tenga [employer]. Trate de no viajar con el bebé susu las primeras 5 o 6 semanas. Si hace un viaje cynthia en automóvil, tiffani paradas frecuentes para caminar y estirarse. Evite el agotamiento  Descanse todos los días. Trate de dormir la siesta cuando giles bebé también lo tiffani.   Pídale a otro adulto que la acompañe por unos tamiko después del [place]. Planifique el cuidado de los niños si tiene otros hijos. Sea flexible para que pueda comer a horas fuera de lo común y dormir cuando lo necesite. Tanto usted ricardo giles bebé están creando horarios nuevos. Planee pequeñas salidas para estar fuera de casa. El cambio podría hacer que se sienta menos fatigada. Pida ayuda para cocinar y [address] hogar y las compras. Recuerde que giles principal tarea consiste en cuidar a giles bebé. Sepa qué ayuda puede recibir en gurpreet de tener depresión posparto  La \"melancolía de la maternidad\" es común susu las primeras 1 a 2 semanas después del Dana. Usted podría llorar o sentirse sierra o irritable sin motivo. Descanse cada vez que pueda hacerlo. Estar fatigada dificulta manejar las emociones. Salga a caminar con giles bebé. Hable con giles luis eduardo, ileana amigos y ileana familiares acerca de ileana sentimientos. Si ileana síntomas gallardo más de [facility], o si se siente muy deprimida, pídale ayuda a giles médico.  La depresión posparto puede tratarse. Los grupos de apoyo y la asesoría psicológica pueden ser Opal Harvinder. A veces, los medicamentos también pueden ayudar. Manténgase saludable  Coma alimentos saludables para tener más energía. Si amamanta, evite las drogas. Si dejó de fumar susu el embarazo, trate de no volver a fumar. Si opta por nikki teresa bebida alcohólica de vez en cuando, solo tome teresa bebida y limite la cantidad de ocasiones en que hector alcohol. Después de beber alcohol, espere al menos 2 horas antes de amamantar para reducir la cantidad de alcohol que el bebé pueda recibir a través de la [place]. Comience a hacer ejercicios diarios después de 4 a 6 semanas, brice descanse cuando se sienta fatigada. Aprenda ejercicios para tonificar el abdomen. Pruebe los ejercicios de Kegel para recuperar la fuerza en los músculos pélvicos. Usted puede hacer estos ejercicios mientras está de pie o en posición sentada. (Si hacer estos ejercicios causa dolor, deje de hacerlos y hable con giles médico). Contraiga los músculos ricardo si estuviera tratando de no ventosear. O contraiga los músculos ricardo si estuviera interrumpiendo un chorro de [place]. Debe tener inmóviles el abdomen, las piernas y las nalgas. Mantenga la contracción por 3 segundos, y luego relaje por entre 5 y 8 segundos. Comience con 3 segundos, luego añada 1 indy cada semana hasta que pueda contraer por 10 segundos. Repita el ejercicio 10 veces por sesión. Ashley de 3 a 8 sesiones al día. Encuentre teresa clase para usted y giles bebé que tenga un tiempo de ejercicio. Si le parra hecho teresa cesárea, dese un poco más de tiempo antes de hacer ejercicio, y tenga cuidado. ¿Cuándo debe pedir ayuda? Llame al 911  en cualquier momento que considere que necesita atención de [place]. Por ejemplo, llame si:    Tiene pensamientos de lastimarse o de hacerle daño a giles bebé o a otra persona. Se desmayó (perdió el conocimiento). Tiene dolor en el pecho, le falta el aire o tose [place]. Tiene convulsiones. Llame a giles médico ahora mismo o busque atención médica de inmediato si:    Tiene sangrado vaginal intenso. Plessis significa que está expulsando coágulos sanguíneos y empapando teresa toalla sanitaria cada hora por 2 horas o más. Está mareada o aturdida, o siente ricardo que se puede desmayar. Tiene fiebre. Tiene dolor abdominal nuevo o más intenso. Tiene señales de un coágulo de sven en la pierna (que se llama trombosis venosa profunda), ricardo:  Dolor en la pantorrilla, el muslo, la kaley o detrás de la rodilla. Enrojecimiento e hinchazón en la pierna o la kaley. Tiene señales de preeclampsia, ricardo:  Hinchazón repentina de la nahid, las matias o los pies. Nuevos problemas de la vista (ricardo oscurecimiento, anurag borroso o anurag puntos). Dolor de laurent intenso.    Preste especial atención a los cambios en giles jim y asegúrese de comunicarse con giles médico si: Giles sangrado vaginal parece volverse más intenso. Tiene flujo vaginal nuevo o que empeora. Se siente sierra, ansiosa o sin esperanzas susu más de unos pocos días. No mejora ricardo se esperaba. ¿Dónde puede encontrar más información en inglés? Jenae Zee a [email or web address].gray.com/  [place] Y[place] en la búsqueda para aprender más acerca de \"Después del parto (período de posparto): Instrucciones de cuidado. \"  Revisado: 23 febrero, 2022               Versión del contenido: 13.4  © 8915-1565 Healthwise, Incorporated. Las instrucciones de cuidado fueron adaptadas bajo licencia por [facility] (which disclaims liability or warranty for this information). Si usted tiene [place] afección médica o sobre estas instrucciones, siempre pregunte a giles profesional de jim. [employer], [employer] niega toda garantía o responsabilidad por giles uso de esta información.

## 2023-03-12 NOTE — PROGRESS NOTES
Post-Partum Progress Note    Patient doing well post-partum without significant complaint. She is voiding without difficulty, she reports normal lochia. Her pain is well controlled with oral pain medication. She is tolerating a general diet. Delivery information:  Information for the patient's :  Fide Hwang [[id number]]   Delivery of a 6 lb 8.1 oz (2.95 kg) female infant via Vaginal, Spontaneous on 3/10/2023 at 9:36 AM  by Otilio Cobos. Apgars were 9  and 9 . Vitals:  No data found. Temp (24hrs), Av.4 °F (36.9 °C), Min:98.3 °F (36.8 °C), Max:98.4 °F (36.9 °C)    Visit Vitals  /62 (BP 1 Location: Left upper arm, BP Patient Position: At rest)   Pulse 67   Temp 98.3 °F (36.8 °C)   Resp 16   SpO2 98%   Breastfeeding Unknown       Exam:    General: Patient without distress. Abdomen: soft, fundus firm at level of umbilicus, nontender  Lower extremities: negative for cords or tenderness. Labs: No results found for this or any previous visit (from the past 24 hour(s)). Assessment:    1. Postpartum S/P spontaneous vaginal delivery  PPD 2  2. Patient doing well without significant complications    Plan:  1. Continue routine postpartum care  2. Routine perineal care and maternal education   3. Oral pain medications and bowel regimen as needed       4.  Discharge planning    Guido Becerra MD

## 2023-03-12 NOTE — DISCHARGE SUMMARY
Obstetrical Discharge Summary     Name: Monty Guardado MRN: [id number]  SSN: [id number]    YOB: 1995  Age: 25 y.o. Sex: female      Admit Date: 3/9/2023    Discharge Date: 3/12/2023     Admitting Physician: Emi Roberts MD     Attending Physician:  Gisselle Estrada DO     Admission Diagnoses:  labor without delivery, third trimester [O60.03]    Discharge Diagnoses:   Information for the patient's :  Morgan Alberto [[id number]]   Delivery of a 2.95 kg female infant via Vaginal, Spontaneous on 3/10/2023 at 9:36 AM  by Ebony Gibbs. Apgars were 9  and 9 . Additional Diagnoses:   Hospital Problems  Date Reviewed: 2023            Codes Class Noted POA     labor without delivery, third trimester ICD-10-CM: O60.03  ICD-9-CM: 644.03  3/9/2023 Unknown        No results found for: RUBELLAEXT, GRBSEXT    Immunization(s):   Immunization History   Administered Date(s) Administered    Influenza, FLUARIX, FLULAVAL, FLUZONE (age 10 mo+) AND AFLURIA, (age 1 y+), PF, 0.5mL 2023    Tdap 2023        Hospital Course:   Patient is a 25 y.o.  s/p  at 37 weeks 0 days. Presented for  Active Labor in setting of regular ctx. Pregnancy complicated by late to care, Chl+ in pregnancy (s/p Azithro). Labor was complicated by 2nd deg perineal lac . Delivered TLFI by . Normal hospital course following the delivery. On day of discharge patient reported minimal lochia, well controlled pain, and no other complaints. Discharged with pain regimen and bowel regimen. Advised to continue prenatal vitamins, Ibuprofen PRN, and Colace PRN. Follow up to be scheduled with [facility] in 2 weeks (EDPS 8). Patient recently immigrated from [place] ~5 mo ago. She has good support from brother and sister at home.      Depression Scale       8    Follow up test at discharge:N/A  Condition at Discharge:  stable  Disposition: Discharge to Home    Physical exam:  Visit Vitals  BP 103/62 (BP 1 Location: Left upper arm, BP Patient Position: At rest)   Pulse 67   Temp 98.3 °F (36.8 °C)   Resp 16   LMP 06/13/2022   SpO2 98%   Breastfeeding Unknown       Exam:  Patient without distress. CTAB, no w/r/r/c               RRR, + S1 and S2, no m/r/g               Abdomen soft, fundus firm at level of umbilicus, non tender               Perineum with normal lochia noted. Lower extremities mild edema in BL feet, no cords or tenderness. Patient Instructions:   Current Discharge Medication List        START taking these medications    Details   docusate sodium (COLACE) 100 mg capsule Take 1 Capsule by mouth daily as needed for Constipation for up to 90 days. Qty: 30 Capsule, Refills: 0  Start date: 3/12/2023, End date: 6/10/2023      ibuprofen (MOTRIN) 800 mg tablet Take 1 Tablet by mouth every eight (8) hours. Qty: 30 Tablet, Refills: 0  Start date: 3/12/2023           CONTINUE these medications which have NOT CHANGED    Details   PNV no.121-iron-folic acid 28 mg iron- 800 mcg tab Take 1 Tablet by mouth daily. Qty: 90 Tablet, Refills: 3                 Reference my discharge instructions.       Signed By:  Leila Chaudhry MD    Family Medicine Resident

## 2023-03-12 NOTE — PROGRESS NOTES
[address] [place] [address]   Office [phone], Fax [phone]                                Post-Partum Day Number 2 Progress Note    Patient doing well post-partum without significant complaint. Pain well controlled. Lochia minimal.  Tolerating diet. Ambulating. Voiding without difficulty. Passing flatus. No BM. Vitals:  No data found. Temp (24hrs), Av.3 °F (36.8 °C), Min:98.1 °F (36.7 °C), Max:98.4 °F (36.9 °C)      Exam:  Patient without distress. CTAB, no w/r/r/c.               RRR, +S1 and S2, no m/r/g. Abdomen soft, fundus firm at level of umbilicus, nontender. Lower extremities:  Trace edema in BL feet. No palpable cords or tenderness. Lab/Data Review:  No results found for this or any previous visit (from the past 12 hour(s)). Assessment and Plan:    Antolin Noe is a 25 y.o.  s/p  at 37 weeks 0 days. Pregnancy was complicated by late to prenatal care and positive chlamydia s/p tx. Deliv c/b 2nd deg perineal lac. Patient appears to be having uncomplicated post-partum course. Continue routine perineal care and maternal education. Plan discharge today.                  Ignacio Calvo MD  Family Medicine Resident

## 2023-03-12 NOTE — PROGRESS NOTES
Patient discharged to home with infant and family. Infant in carseat. Patient in wheelchair. Discharge supplies given. Prescriptions e sent to patient's pharmacy by OB. Discharge instructions reviewed with patient via [facility] , signed by patient, and copies given. Per patient and family, all questions answered. Patient and infant bands verified. See infant note and/or footprint sheet on chart.

## 2023-05-16 ENCOUNTER — OFFICE VISIT (OUTPATIENT)
Age: 28
End: 2023-05-16

## 2023-05-16 VITALS
TEMPERATURE: 98.1 F | HEART RATE: 90 BPM | HEIGHT: 60 IN | SYSTOLIC BLOOD PRESSURE: 111 MMHG | OXYGEN SATURATION: 97 % | WEIGHT: 107.2 LBS | RESPIRATION RATE: 18 BRPM | BODY MASS INDEX: 21.05 KG/M2 | DIASTOLIC BLOOD PRESSURE: 64 MMHG

## 2023-05-16 RX ORDER — PNV NO.95/FERROUS FUM/FOLIC AC 28MG-0.8MG
1 TABLET ORAL DAILY
COMMUNITY
Start: 2023-01-26

## 2023-05-16 SDOH — ECONOMIC STABILITY: HOUSING INSECURITY
IN THE LAST 12 MONTHS, WAS THERE A TIME WHEN YOU DID NOT HAVE A STEADY PLACE TO SLEEP OR SLEPT IN A SHELTER (INCLUDING NOW)?: NO

## 2023-05-16 SDOH — ECONOMIC STABILITY: INCOME INSECURITY: HOW HARD IS IT FOR YOU TO PAY FOR THE VERY BASICS LIKE FOOD, HOUSING, MEDICAL CARE, AND HEATING?: VERY HARD

## 2023-05-16 SDOH — ECONOMIC STABILITY: FOOD INSECURITY: WITHIN THE PAST 12 MONTHS, THE FOOD YOU BOUGHT JUST DIDN'T LAST AND YOU DIDN'T HAVE MONEY TO GET MORE.: NEVER TRUE

## 2023-05-16 SDOH — ECONOMIC STABILITY: FOOD INSECURITY: WITHIN THE PAST 12 MONTHS, YOU WORRIED THAT YOUR FOOD WOULD RUN OUT BEFORE YOU GOT MONEY TO BUY MORE.: NEVER TRUE

## 2023-05-16 NOTE — PROGRESS NOTES
[facility]  Services Utilized for [address]  [place] Interpretor Number: [id number]    Identified pt with two pt identifiers(name and ). Reviewed record in preparation for visit and have obtained necessary documentation. Chief Complaint   Patient presents with    Postpartum Care        Health Maintenance Due   Topic    COVID-19 Vaccine (1)    Varicella vaccine (1 of 2 - 2-dose childhood series)       Vitals:    23 1414   BP: 111/64   Site: Right Upper Arm   Position: Sitting   Cuff Size: Small Adult   Pulse: 90   Resp: 18   Temp: 98.1 °F (36.7 °C)   TempSrc: Oral   SpO2: 97%   Weight: 107 lb 3.2 oz (48.6 kg)   Height: 4' 11.75\" (1.518 m)           Coordination of Care Questionnaire:  :   1. Have you been to the ER, urgent care clinic since your last visit? Hospitalized since your last visit? No    2. Have you seen or consulted any other health care providers outside of the [address] [place] [address] since your last visit? Include any pap smears or colon screening. No    This patient is accompanied in the office by her self.
I reviewed the patient's medical history, the resident's findings on physical examination, the patient's diagnoses, and treatment plan as documented in the resident note. I concur with the treatment plan as documented.
Postpartum Care:   - Continue routine care  - Call clinic or make appointment for symptoms of sadness  - Follow up for yearly well woman exam.              Patient is counseled to return to the office if symptoms do not improve as expected. Urgent consultation with the nearest Emergency Department is strongly recommended if condition worsens. Patient is counseled to follow up as recommended and to inform the office if any changes in treatment are recommended.       I discussed this patient with Dr. Pat Giang (Attending Physician)       Signed By:  Karthikeyan Varela MD  Family Medicine Resident

## 2023-05-16 NOTE — CONSULTS
Session ID: [id number]  Request VM:[id number]  Language:Kazakh  Status:Fulfilled  Agent IR:#[id number]  Agent Name:Nathaniel